# Patient Record
Sex: FEMALE | Race: WHITE | NOT HISPANIC OR LATINO | URBAN - METROPOLITAN AREA
[De-identification: names, ages, dates, MRNs, and addresses within clinical notes are randomized per-mention and may not be internally consistent; named-entity substitution may affect disease eponyms.]

---

## 2020-07-25 ENCOUNTER — TELEPHONE ENCOUNTER (OUTPATIENT)
Dept: URBAN - METROPOLITAN AREA CLINIC 13 | Facility: CLINIC | Age: 65
End: 2020-07-25

## 2020-07-25 RX ORDER — ELUXADOLINE 75 MG/1
TAKE 2 TABLET DAILY TABLET, FILM COATED ORAL
Refills: 0 | OUTPATIENT
End: 2019-01-29

## 2020-07-26 ENCOUNTER — TELEPHONE ENCOUNTER (OUTPATIENT)
Dept: URBAN - METROPOLITAN AREA CLINIC 13 | Facility: CLINIC | Age: 65
End: 2020-07-26

## 2020-07-26 RX ORDER — DICYCLOMINE HYDROCHLORIDE 10 MG/1
TAKE 1 CAPSULE BY MOUTH  EVERY 6 HOURS AS NEEDED FOR ABDOMINAL PAIN CAPSULE ORAL
Qty: 60 | Refills: 1 | Status: ACTIVE | COMMUNITY
Start: 2019-04-18

## 2020-07-26 RX ORDER — RIZATRIPTAN BENZOATE 10 MG/1
TAKE 1 TABLET AT ONSET OF HEADACHE. MAY REPEAT EVERY 2 HOURS AS NEEDED. MAXIMUM 3 TABLETS IN 24 HOURS TABLET ORAL
Refills: 0 | Status: ACTIVE | COMMUNITY
Start: 2018-09-26

## 2020-07-26 RX ORDER — GLUCOSAM/MSM/C/MAN/WILLOW/GING 500-333.3
TAKE AS DIRECTED TABLET ORAL
Refills: 0 | Status: ACTIVE | COMMUNITY

## 2020-07-26 RX ORDER — POTASSIUM CHLORIDE 750 MG/1
TABLET, EXTENDED RELEASE ORAL
Qty: 30 | Refills: 0 | Status: ACTIVE | COMMUNITY
Start: 2019-01-02

## 2020-07-26 RX ORDER — TOPIRAMATE 50 MG/1
TAKE 1 TABLET TWICE DAILY TABLET, FILM COATED ORAL
Refills: 0 | Status: ACTIVE | COMMUNITY
Start: 2018-03-07

## 2020-07-26 RX ORDER — DICLOFENAC SODIUM 75 MG/1
TK 1 T PO  BID TABLET, DELAYED RELEASE ORAL
Qty: 60 | Refills: 0 | Status: ACTIVE | COMMUNITY
Start: 2018-09-27

## 2020-07-26 RX ORDER — FLUOXETINE HYDROCHLORIDE 40 MG/1
TAKE 1 CAPSULE DAILY CAPSULE ORAL
Refills: 0 | Status: ACTIVE | COMMUNITY
Start: 2018-04-18

## 2020-07-26 RX ORDER — AMITRIPTYLINE HYDROCHLORIDE 25 MG/1
TABLET, FILM COATED ORAL
Qty: 30 | Refills: 0 | Status: ACTIVE | COMMUNITY
Start: 2019-05-06

## 2020-07-26 RX ORDER — METRONIDAZOLE 500 MG/1
TABLET ORAL
Qty: 10 | Refills: 0 | Status: ACTIVE | COMMUNITY
Start: 2018-11-16

## 2020-07-26 RX ORDER — PRAVASTATIN SODIUM 40 MG/1
TAKE 1 TABLET DAILY AS DIRECTED TABLET ORAL
Refills: 0 | Status: ACTIVE | COMMUNITY
Start: 2018-03-07

## 2020-07-26 RX ORDER — EYELID CLEANSER COMBINATION 1
CLEAN FROM THE BROW BONE TO BELOW THE EYE SOCKET OF THE OPERATIVE EYE FOAM (ML) TOPICAL
Qty: 48 | Refills: 0 | Status: ACTIVE | COMMUNITY
Start: 2018-03-13

## 2020-07-26 RX ORDER — SENNOSIDES 8.6 MG
TAKE 1 CAPSULE DAILY WITH A MEAL TABLET ORAL
Refills: 0 | Status: ACTIVE | COMMUNITY

## 2020-07-26 RX ORDER — FLUOXETINE 20 MG/1
CAPSULE ORAL
Qty: 57 | Refills: 0 | Status: ACTIVE | COMMUNITY
Start: 2018-03-07

## 2020-07-26 RX ORDER — GABAPENTIN 100 MG/1
TAKE 3 CAPSULE TWICE DAILY CAPSULE ORAL
Refills: 0 | Status: ACTIVE | COMMUNITY
Start: 2018-01-17

## 2020-07-26 RX ORDER — DIPHENOXYLATE HYDROCHLORIDE AND ATROPINE SULFATE 2.5; .025 MG/1; MG/1
TABLET ORAL
Qty: 6 | Refills: 0 | Status: ACTIVE | COMMUNITY
Start: 2018-11-27

## 2020-07-26 RX ORDER — IVERMECTIN 10 MG/G
CREAM TOPICAL
Refills: 0 | Status: ACTIVE | COMMUNITY

## 2020-07-26 RX ORDER — LOSARTAN POTASSIUM 50 MG/1
TABLET, FILM COATED ORAL
Qty: 30 | Refills: 0 | Status: ACTIVE | COMMUNITY
Start: 2018-01-18

## 2020-07-26 RX ORDER — SODIUM CHLORIDE 50 MG/ML
INSTILL 1 DROP INTO RIGHT EYE THREE TIMES A DAY SOLUTION/ DROPS OPHTHALMIC
Qty: 15 | Refills: 0 | Status: ACTIVE | COMMUNITY
Start: 2018-03-27

## 2020-07-26 RX ORDER — PROMETHAZINE HYDROCHLORIDE 25 MG/1
TAKE 1 TABLET EVERY 6 HOURS PRN NAUSEA TABLET ORAL
Refills: 3 | Status: ACTIVE | COMMUNITY

## 2020-07-26 RX ORDER — GABAPENTIN 300 MG/1
CAPSULE ORAL
Qty: 270 | Refills: 0 | Status: ACTIVE | COMMUNITY
Start: 2018-06-22

## 2020-07-26 RX ORDER — CIPROFLOXACIN HYDROCHLORIDE 500 MG/1
TABLET, FILM COATED ORAL
Qty: 10 | Refills: 0 | Status: ACTIVE | COMMUNITY
Start: 2018-11-16

## 2020-07-26 RX ORDER — HYDROCHLOROTHIAZIDE 25 MG/1
TABLET ORAL
Qty: 30 | Refills: 0 | Status: ACTIVE | COMMUNITY
Start: 2018-01-18

## 2020-07-26 RX ORDER — PREDNISONE 10 MG/1
TAKE AS DIRECTED TABLET ORAL
Qty: 21 | Refills: 0 | Status: ACTIVE | COMMUNITY
Start: 2018-09-26

## 2020-07-26 RX ORDER — LOSARTAN POTASSIUM AND HYDROCHLOROTHIAZIDE 100; 25 MG/1; MG/1
TAKE 1 TABLET DAILY TABLET, FILM COATED ORAL
Refills: 0 | Status: ACTIVE | COMMUNITY
Start: 2018-03-07

## 2020-07-26 RX ORDER — PANTOPRAZOLE SODIUM 40 MG/1
TAKE ONE TABLET TWICE A DAY TABLET, DELAYED RELEASE ORAL
Qty: 60 | Refills: 2 | Status: ACTIVE | COMMUNITY
Start: 2018-03-07

## 2020-07-26 RX ORDER — MINOCYCLINE HYDROCHLORIDE 100 MG/1
TAKE 1 TABLET AT BEDTIME TABLET, FILM COATED ORAL
Refills: 0 | Status: ACTIVE | COMMUNITY
Start: 2019-01-02

## 2020-07-26 RX ORDER — ESCITALOPRAM 20 MG/1
TABLET, FILM COATED ORAL
Qty: 30 | Refills: 0 | Status: ACTIVE | COMMUNITY
Start: 2018-01-05

## 2020-07-26 RX ORDER — POTASSIUM CHLORIDE 750 MG/1
TAKE 1 TABLET DAILY TABLET, EXTENDED RELEASE ORAL
Refills: 0 | Status: ACTIVE | COMMUNITY
Start: 2018-08-08

## 2020-07-26 RX ORDER — MINOCYCLINE HYDROCHLORIDE 100 MG/1
CAPSULE ORAL
Qty: 60 | Refills: 0 | Status: ACTIVE | COMMUNITY
Start: 2019-06-06

## 2020-07-26 RX ORDER — OXYCODONE AND ACETAMINOPHEN 10; 325 MG/1; MG/1
TABLET ORAL
Qty: 20 | Refills: 0 | Status: ACTIVE | COMMUNITY
Start: 2018-07-25

## 2020-07-26 RX ORDER — TRAMADOL HYDROCHLORIDE 50 MG/1
TABLET ORAL
Qty: 40 | Refills: 0 | Status: ACTIVE | COMMUNITY
Start: 2018-08-30

## 2020-07-26 RX ORDER — PSYLLIUM HUSK 0.4 G
USE AS DIRECTED CAPSULE ORAL
Refills: 0 | Status: ACTIVE | COMMUNITY

## 2020-07-26 RX ORDER — METHYLPREDNISOLONE 4 MG/1
TABLET ORAL
Qty: 21 | Refills: 0 | Status: ACTIVE | COMMUNITY
Start: 2018-10-31

## 2020-09-08 ENCOUNTER — WEB ENCOUNTER (OUTPATIENT)
Dept: URBAN - METROPOLITAN AREA CLINIC 113 | Facility: CLINIC | Age: 65
End: 2020-09-08

## 2020-09-08 ENCOUNTER — TELEPHONE ENCOUNTER (OUTPATIENT)
Dept: URBAN - METROPOLITAN AREA CLINIC 113 | Facility: CLINIC | Age: 65
End: 2020-09-08

## 2020-09-08 VITALS — WEIGHT: 250 LBS | HEIGHT: 66 IN | BODY MASS INDEX: 40.18 KG/M2

## 2020-09-08 RX ORDER — MINOCYCLINE HYDROCHLORIDE 100 MG/1
TAKE 1 TABLET AT BEDTIME TABLET, FILM COATED ORAL
Refills: 0 | Status: ACTIVE | COMMUNITY
Start: 2019-01-02

## 2020-09-08 RX ORDER — PANTOPRAZOLE SODIUM 40 MG/1
TAKE ONE TABLET TWICE A DAY TABLET, DELAYED RELEASE ORAL
Qty: 60 | Refills: 2 | Status: ACTIVE | COMMUNITY
Start: 2018-03-07

## 2020-09-08 RX ORDER — POTASSIUM CHLORIDE 750 MG/1
TABLET, EXTENDED RELEASE ORAL
Qty: 30 | Refills: 0 | Status: ON HOLD | COMMUNITY
Start: 2019-01-02

## 2020-09-08 RX ORDER — FLUOXETINE HYDROCHLORIDE 40 MG/1
TAKE 1 CAPSULE DAILY CAPSULE ORAL
Refills: 0 | Status: ACTIVE | COMMUNITY
Start: 2018-04-18

## 2020-09-08 RX ORDER — TOPIRAMATE 50 MG/1
TAKE 1 TABLET TWICE DAILY TABLET, FILM COATED ORAL
Refills: 0 | Status: ACTIVE | COMMUNITY
Start: 2018-03-07

## 2020-09-08 RX ORDER — PREGABALIN 100 MG/1
1 CAPSULE CAPSULE ORAL ONCE A DAY
Status: ACTIVE | COMMUNITY

## 2020-09-08 RX ORDER — SENNOSIDES 8.6 MG
TAKE 1 CAPSULE DAILY WITH A MEAL TABLET ORAL
Refills: 0 | Status: ACTIVE | COMMUNITY

## 2020-09-08 RX ORDER — DICYCLOMINE HYDROCHLORIDE 10 MG/1
TAKE 1 CAPSULE BY MOUTH  EVERY 6 HOURS AS NEEDED FOR ABDOMINAL PAIN CAPSULE ORAL
Qty: 60 | Refills: 1 | Status: ACTIVE | COMMUNITY
Start: 2019-04-18

## 2020-09-08 RX ORDER — PRAVASTATIN SODIUM 40 MG/1
TAKE 1 TABLET DAILY AS DIRECTED TABLET ORAL
Refills: 0 | Status: ACTIVE | COMMUNITY
Start: 2018-03-07

## 2020-09-08 RX ORDER — TRAMADOL HYDROCHLORIDE 50 MG/1
TABLET ORAL
Qty: 40 | Refills: 0 | Status: ON HOLD | COMMUNITY
Start: 2018-08-30

## 2020-09-08 RX ORDER — ESCITALOPRAM 20 MG/1
TABLET, FILM COATED ORAL
Qty: 30 | Refills: 0 | Status: ON HOLD | COMMUNITY
Start: 2018-01-05

## 2020-09-08 RX ORDER — HYDROCHLOROTHIAZIDE 25 MG/1
TABLET ORAL
Qty: 30 | Refills: 0 | Status: ON HOLD | COMMUNITY
Start: 2018-01-18

## 2020-09-08 RX ORDER — ZOLPIDEM TARTRATE 10 MG/1
1 TABLET AT BEDTIME AS NEEDED TABLET, FILM COATED ORAL ONCE A DAY
Status: ACTIVE | COMMUNITY

## 2020-09-08 RX ORDER — METRONIDAZOLE 500 MG/1
TABLET ORAL
Qty: 10 | Refills: 0 | Status: ON HOLD | COMMUNITY
Start: 2018-11-16

## 2020-09-08 RX ORDER — FLUOXETINE 20 MG/1
CAPSULE ORAL
Qty: 57 | Refills: 0 | Status: ON HOLD | COMMUNITY
Start: 2018-03-07

## 2020-09-08 RX ORDER — POTASSIUM CHLORIDE 750 MG/1
TAKE 1 TABLET DAILY TABLET, EXTENDED RELEASE ORAL
Refills: 0 | Status: ON HOLD | COMMUNITY
Start: 2018-08-08

## 2020-09-08 RX ORDER — EYELID CLEANSER COMBINATION 1
CLEAN FROM THE BROW BONE TO BELOW THE EYE SOCKET OF THE OPERATIVE EYE FOAM (ML) TOPICAL
Qty: 48 | Refills: 0 | Status: ON HOLD | COMMUNITY
Start: 2018-03-13

## 2020-09-08 RX ORDER — GABAPENTIN 300 MG/1
CAPSULE ORAL
Qty: 270 | Refills: 0 | Status: ON HOLD | COMMUNITY
Start: 2018-06-22

## 2020-09-08 RX ORDER — GABAPENTIN 100 MG/1
TAKE 3 CAPSULE TWICE DAILY CAPSULE ORAL
Refills: 0 | Status: ON HOLD | COMMUNITY
Start: 2018-01-17

## 2020-09-08 RX ORDER — SODIUM CHLORIDE 50 MG/ML
INSTILL 1 DROP INTO RIGHT EYE THREE TIMES A DAY SOLUTION/ DROPS OPHTHALMIC
Qty: 15 | Refills: 0 | Status: ON HOLD | COMMUNITY
Start: 2018-03-27

## 2020-09-08 RX ORDER — RIZATRIPTAN BENZOATE 10 MG/1
TAKE 1 TABLET AT ONSET OF HEADACHE. MAY REPEAT EVERY 2 HOURS AS NEEDED. MAXIMUM 3 TABLETS IN 24 HOURS TABLET ORAL
Refills: 0 | Status: ACTIVE | COMMUNITY
Start: 2018-09-26

## 2020-09-08 RX ORDER — LEVOCETIRIZINE DIHYDROCHLORIDE 5 MG/1
1 TABLET IN THE EVENING TABLET, FILM COATED ORAL ONCE A DAY
Qty: 30 TABLET | Status: ACTIVE | COMMUNITY

## 2020-09-08 RX ORDER — CIPROFLOXACIN HYDROCHLORIDE 500 MG/1
TABLET, FILM COATED ORAL
Qty: 10 | Refills: 0 | Status: ON HOLD | COMMUNITY
Start: 2018-11-16

## 2020-09-08 RX ORDER — PREDNISONE 10 MG/1
TAKE AS DIRECTED TABLET ORAL
Qty: 21 | Refills: 0 | Status: ON HOLD | COMMUNITY
Start: 2018-09-26

## 2020-09-08 RX ORDER — AMITRIPTYLINE HYDROCHLORIDE 25 MG/1
TABLET, FILM COATED ORAL
Qty: 30 | Refills: 0 | Status: ACTIVE | COMMUNITY
Start: 2019-05-06

## 2020-09-08 RX ORDER — PROMETHAZINE HYDROCHLORIDE 25 MG/1
TAKE 1 TABLET EVERY 6 HOURS PRN NAUSEA TABLET ORAL
Refills: 3 | Status: ACTIVE | COMMUNITY

## 2020-09-08 RX ORDER — OXYCODONE AND ACETAMINOPHEN 10; 325 MG/1; MG/1
TABLET ORAL
Qty: 20 | Refills: 0 | Status: ON HOLD | COMMUNITY
Start: 2018-07-25

## 2020-09-08 RX ORDER — MINOCYCLINE HYDROCHLORIDE 100 MG/1
CAPSULE ORAL
Qty: 60 | Refills: 0 | Status: ON HOLD | COMMUNITY
Start: 2019-06-06

## 2020-09-08 RX ORDER — DICLOFENAC SODIUM 75 MG/1
TK 1 T PO  BID TABLET, DELAYED RELEASE ORAL
Qty: 60 | Refills: 0 | Status: ON HOLD | COMMUNITY
Start: 2018-09-27

## 2020-09-08 RX ORDER — SODIUM, POTASSIUM,MAG SULFATES 17.5-3.13G
354ML SOLUTION, RECONSTITUTED, ORAL ORAL
Qty: 354 ML | Refills: 0 | OUTPATIENT

## 2020-09-08 RX ORDER — LOSARTAN POTASSIUM 50 MG/1
TABLET, FILM COATED ORAL
Qty: 30 | Refills: 0 | Status: ON HOLD | COMMUNITY
Start: 2018-01-18

## 2020-09-08 RX ORDER — DIPHENOXYLATE HYDROCHLORIDE AND ATROPINE SULFATE 2.5; .025 MG/1; MG/1
TABLET ORAL
Qty: 6 | Refills: 0 | Status: ON HOLD | COMMUNITY
Start: 2018-11-27

## 2020-09-08 RX ORDER — LOSARTAN POTASSIUM AND HYDROCHLOROTHIAZIDE 100; 25 MG/1; MG/1
TAKE 1 TABLET DAILY TABLET, FILM COATED ORAL
Refills: 0 | Status: ACTIVE | COMMUNITY
Start: 2018-03-07

## 2020-09-08 RX ORDER — METHYLPREDNISOLONE 4 MG/1
TABLET ORAL
Qty: 21 | Refills: 0 | Status: ON HOLD | COMMUNITY
Start: 2018-10-31

## 2020-09-08 RX ORDER — IVERMECTIN 10 MG/G
CREAM TOPICAL
Refills: 0 | Status: ON HOLD | COMMUNITY

## 2020-09-08 RX ORDER — GLUCOSAM/MSM/C/MAN/WILLOW/GING 500-333.3
TAKE AS DIRECTED TABLET ORAL
Refills: 0 | Status: ACTIVE | COMMUNITY

## 2020-09-08 RX ORDER — PSYLLIUM HUSK 0.4 G
USE AS DIRECTED CAPSULE ORAL
Refills: 0 | Status: ACTIVE | COMMUNITY

## 2020-11-13 ENCOUNTER — OFFICE VISIT (OUTPATIENT)
Dept: URBAN - METROPOLITAN AREA CLINIC 113 | Facility: CLINIC | Age: 65
End: 2020-11-13
Payer: COMMERCIAL

## 2020-11-13 ENCOUNTER — WEB ENCOUNTER (OUTPATIENT)
Dept: URBAN - METROPOLITAN AREA CLINIC 113 | Facility: CLINIC | Age: 65
End: 2020-11-13

## 2020-11-13 VITALS
BODY MASS INDEX: 40.98 KG/M2 | HEIGHT: 66 IN | RESPIRATION RATE: 18 BRPM | SYSTOLIC BLOOD PRESSURE: 128 MMHG | TEMPERATURE: 98.3 F | HEART RATE: 75 BPM | DIASTOLIC BLOOD PRESSURE: 72 MMHG | WEIGHT: 255 LBS

## 2020-11-13 DIAGNOSIS — K58.0 IRRITABLE BOWEL SYNDROME WITH DIARRHEA: ICD-10-CM

## 2020-11-13 DIAGNOSIS — Z91.89 COLON CANCER HIGH RISK: ICD-10-CM

## 2020-11-13 DIAGNOSIS — R10.10 PAIN OF UPPER ABDOMEN: ICD-10-CM

## 2020-11-13 PROCEDURE — 99214 OFFICE O/P EST MOD 30 MIN: CPT | Performed by: NURSE PRACTITIONER

## 2020-11-13 PROCEDURE — G8482 FLU IMMUNIZE ORDER/ADMIN: HCPCS | Performed by: NURSE PRACTITIONER

## 2020-11-13 PROCEDURE — G8427 DOCREV CUR MEDS BY ELIG CLIN: HCPCS | Performed by: NURSE PRACTITIONER

## 2020-11-13 RX ORDER — PRAVASTATIN SODIUM 40 MG/1
TAKE 1 TABLET DAILY AS DIRECTED TABLET ORAL
Refills: 0 | Status: ACTIVE | COMMUNITY
Start: 2018-03-07

## 2020-11-13 RX ORDER — ZOLPIDEM TARTRATE 10 MG/1
1 TABLET AT BEDTIME AS NEEDED TABLET, FILM COATED ORAL ONCE A DAY
Status: ACTIVE | COMMUNITY

## 2020-11-13 RX ORDER — DICYCLOMINE HYDROCHLORIDE 10 MG/1
TAKE 1 CAPSULE BY MOUTH  EVERY 6 HOURS AS NEEDED FOR ABDOMINAL PAIN CAPSULE ORAL
Qty: 60 | Refills: 3
Start: 2019-04-18 | End: 2019-08-16

## 2020-11-13 RX ORDER — RIZATRIPTAN BENZOATE 10 MG/1
TAKE 1 TABLET AT ONSET OF HEADACHE. MAY REPEAT EVERY 2 HOURS AS NEEDED. MAXIMUM 3 TABLETS IN 24 HOURS TABLET ORAL
Refills: 0 | Status: ACTIVE | COMMUNITY
Start: 2018-09-26

## 2020-11-13 RX ORDER — PREGABALIN 100 MG/1
1 CAPSULE CAPSULE ORAL TWICE DAILY
Status: ACTIVE | COMMUNITY

## 2020-11-13 RX ORDER — TOPIRAMATE 50 MG/1
TAKE 1 TABLET TWICE DAILY TABLET, FILM COATED ORAL
Refills: 0 | Status: ACTIVE | COMMUNITY
Start: 2018-03-07

## 2020-11-13 RX ORDER — SENNOSIDES 8.6 MG
TAKE 1 CAPSULE DAILY WITH A MEAL TABLET ORAL
Refills: 0 | Status: ACTIVE | COMMUNITY

## 2020-11-13 RX ORDER — PSYLLIUM HUSK 0.4 G
USE AS DIRECTED CAPSULE ORAL
Refills: 0 | Status: ACTIVE | COMMUNITY

## 2020-11-13 RX ORDER — SODIUM, POTASSIUM,MAG SULFATES 17.5-3.13G
354 ML SOLUTION, RECONSTITUTED, ORAL ORAL
Qty: 354 ML | Refills: 0 | OUTPATIENT

## 2020-11-13 RX ORDER — PANTOPRAZOLE SODIUM 40 MG/1
TAKE ONE TABLET TWICE A DAY TABLET, DELAYED RELEASE ORAL
Qty: 60 | Refills: 2 | Status: ACTIVE | COMMUNITY
Start: 2018-03-07

## 2020-11-13 RX ORDER — MINOCYCLINE HYDROCHLORIDE 100 MG/1
TAKE 1 TABLET AT BEDTIME TABLET, FILM COATED ORAL
Refills: 0 | Status: ACTIVE | COMMUNITY
Start: 2019-01-02

## 2020-11-13 RX ORDER — LOSARTAN POTASSIUM AND HYDROCHLOROTHIAZIDE 100; 25 MG/1; MG/1
TAKE 1 TABLET DAILY TABLET, FILM COATED ORAL
Refills: 0 | Status: ACTIVE | COMMUNITY
Start: 2018-03-07

## 2020-11-13 RX ORDER — GLUCOSAM/MSM/C/MAN/WILLOW/GING 500-333.3
TAKE AS DIRECTED TABLET ORAL
Refills: 0 | Status: ACTIVE | COMMUNITY

## 2020-11-13 RX ORDER — VITAMIN E 200 UNIT
AS DIRECTED CAPSULE ORAL
Status: ACTIVE | COMMUNITY

## 2020-11-13 RX ORDER — FLUOXETINE HYDROCHLORIDE 40 MG/1
TAKE 1 CAPSULE DAILY CAPSULE ORAL
Refills: 0 | Status: ACTIVE | COMMUNITY
Start: 2018-04-18

## 2020-11-13 RX ORDER — DICYCLOMINE HYDROCHLORIDE 10 MG/1
TAKE 1 CAPSULE BY MOUTH  EVERY 6 HOURS AS NEEDED FOR ABDOMINAL PAIN CAPSULE ORAL
Qty: 60 | Refills: 1 | Status: ACTIVE | COMMUNITY
Start: 2019-04-18

## 2020-11-13 NOTE — HPI-TODAY'S VISIT:
This is a 64-year-old female with a history of hypertension, hyperlipidemia, asthma, obesity, sleep apnea, upper GI bleed secondary to peptic ulcer disease (2010 and 2014), and migraines presenting for evaluation of stomach issues and to schedule a colonoscopy.  She was last seen 2/26/2019.  She reported a 1 week history of epigastric pain with nausea and GERD.  This had resolved with high-dose acid suppression.  Her hemoglobin was stable.  She was instructed to continue pantoprazole 40 mg twice a day and to continue dicyclomine as needed for abdominal pain.  EGD was a future consideration. She reports a change in the probiotic she was taking (less concentration of bacteria) 2 months ago.  She began to have an increase in symptoms reporting "gurgling," and frequent diarrhea.  She has resumed her prior probiotic and symptoms have not resolved.  She is having a bowel movement 2-3 times per day.  Stool consistency is variable ranging from formed to liquid.  She has occasional urgency to defecate and occasional nocturnal stools.  She denies bloating but reports excessive gas and excessive abdominal noise.  She had pain across her upper abdomen for 2 days last week.  This occurred at random and was unassociated with meals or defecation.  This has resolved.  She has occasional nausea.  She was experiencing a raspy voice.  This resolved.  She is  taking a Metamucil pill daily.  She denies antecedent antibiotics. Her last colonoscopy was 5 years ago.  She has a family history of colon cancer in 2 second-degree relatives (maternal grandmother and paternal grandmother).

## 2020-11-15 ENCOUNTER — LAB OUTSIDE AN ENCOUNTER (OUTPATIENT)
Dept: URBAN - METROPOLITAN AREA CLINIC 113 | Facility: CLINIC | Age: 65
End: 2020-11-15

## 2020-11-15 PROBLEM — 83132003: Status: ACTIVE | Noted: 2020-11-13

## 2020-12-03 ENCOUNTER — OFFICE VISIT (OUTPATIENT)
Dept: URBAN - METROPOLITAN AREA SURGERY CENTER 25 | Facility: SURGERY CENTER | Age: 65
End: 2020-12-03
Payer: COMMERCIAL

## 2020-12-03 DIAGNOSIS — Z80.0 FAMILY HISTORY MALIGNANT NEOPLASM OF BILIARY TRACT: ICD-10-CM

## 2020-12-03 PROCEDURE — G9935 CANC NOT DETECTD DURING SRCN: HCPCS | Performed by: INTERNAL MEDICINE

## 2020-12-03 PROCEDURE — G8907 PT DOC NO EVENTS ON DISCHARG: HCPCS | Performed by: INTERNAL MEDICINE

## 2020-12-03 PROCEDURE — G0105 COLORECTAL SCRN; HI RISK IND: HCPCS | Performed by: INTERNAL MEDICINE

## 2020-12-03 RX ORDER — GLUCOSAM/MSM/C/MAN/WILLOW/GING 500-333.3
TAKE AS DIRECTED TABLET ORAL
Refills: 0 | Status: ACTIVE | COMMUNITY

## 2020-12-03 RX ORDER — RIZATRIPTAN BENZOATE 10 MG/1
TAKE 1 TABLET AT ONSET OF HEADACHE. MAY REPEAT EVERY 2 HOURS AS NEEDED. MAXIMUM 3 TABLETS IN 24 HOURS TABLET ORAL
Refills: 0 | Status: ACTIVE | COMMUNITY
Start: 2018-09-26

## 2020-12-03 RX ORDER — TOPIRAMATE 50 MG/1
TAKE 1 TABLET TWICE DAILY TABLET, FILM COATED ORAL
Refills: 0 | Status: ACTIVE | COMMUNITY
Start: 2018-03-07

## 2020-12-03 RX ORDER — PREGABALIN 100 MG/1
1 CAPSULE CAPSULE ORAL TWICE DAILY
Status: ACTIVE | COMMUNITY

## 2020-12-03 RX ORDER — FLUOXETINE HYDROCHLORIDE 40 MG/1
TAKE 1 CAPSULE DAILY CAPSULE ORAL
Refills: 0 | Status: ACTIVE | COMMUNITY
Start: 2018-04-18

## 2020-12-03 RX ORDER — PRAVASTATIN SODIUM 40 MG/1
TAKE 1 TABLET DAILY AS DIRECTED TABLET ORAL
Refills: 0 | Status: ACTIVE | COMMUNITY
Start: 2018-03-07

## 2020-12-03 RX ORDER — MINOCYCLINE HYDROCHLORIDE 100 MG/1
TAKE 1 TABLET AT BEDTIME TABLET, FILM COATED ORAL
Refills: 0 | Status: ACTIVE | COMMUNITY
Start: 2019-01-02

## 2020-12-03 RX ORDER — SENNOSIDES 8.6 MG
TAKE 1 CAPSULE DAILY WITH A MEAL TABLET ORAL
Refills: 0 | Status: ACTIVE | COMMUNITY

## 2020-12-03 RX ORDER — LOSARTAN POTASSIUM AND HYDROCHLOROTHIAZIDE 100; 25 MG/1; MG/1
TAKE 1 TABLET DAILY TABLET, FILM COATED ORAL
Refills: 0 | Status: ACTIVE | COMMUNITY
Start: 2018-03-07

## 2020-12-03 RX ORDER — ZOLPIDEM TARTRATE 10 MG/1
1 TABLET AT BEDTIME AS NEEDED TABLET, FILM COATED ORAL ONCE A DAY
Status: ACTIVE | COMMUNITY

## 2020-12-03 RX ORDER — PSYLLIUM HUSK 0.4 G
USE AS DIRECTED CAPSULE ORAL
Refills: 0 | Status: ACTIVE | COMMUNITY

## 2020-12-03 RX ORDER — PANTOPRAZOLE SODIUM 40 MG/1
TAKE ONE TABLET TWICE A DAY TABLET, DELAYED RELEASE ORAL
Qty: 60 | Refills: 2 | Status: ACTIVE | COMMUNITY
Start: 2018-03-07

## 2020-12-03 RX ORDER — VITAMIN E 200 UNIT
AS DIRECTED CAPSULE ORAL
Status: ACTIVE | COMMUNITY

## 2020-12-03 RX ORDER — SODIUM, POTASSIUM,MAG SULFATES 17.5-3.13G
354 ML SOLUTION, RECONSTITUTED, ORAL ORAL
Qty: 354 ML | Refills: 0 | Status: ACTIVE | COMMUNITY

## 2020-12-17 ENCOUNTER — OFFICE VISIT (OUTPATIENT)
Dept: URBAN - METROPOLITAN AREA CLINIC 113 | Facility: CLINIC | Age: 65
End: 2020-12-17

## 2021-01-05 ENCOUNTER — OFFICE VISIT (OUTPATIENT)
Dept: URBAN - METROPOLITAN AREA CLINIC 113 | Facility: CLINIC | Age: 66
End: 2021-01-05
Payer: COMMERCIAL

## 2021-01-05 VITALS
HEIGHT: 66 IN | SYSTOLIC BLOOD PRESSURE: 123 MMHG | RESPIRATION RATE: 18 BRPM | WEIGHT: 255 LBS | BODY MASS INDEX: 40.98 KG/M2 | TEMPERATURE: 96.7 F | HEART RATE: 66 BPM | DIASTOLIC BLOOD PRESSURE: 69 MMHG

## 2021-01-05 DIAGNOSIS — K21.9 GASTROESOPHAGEAL REFLUX DISEASE, UNSPECIFIED WHETHER ESOPHAGITIS PRESENT: ICD-10-CM

## 2021-01-05 DIAGNOSIS — K58.0 IRRITABLE BOWEL SYNDROME WITH DIARRHEA: ICD-10-CM

## 2021-01-05 DIAGNOSIS — K57.30 COLON, DIVERTICULOSIS: ICD-10-CM

## 2021-01-05 DIAGNOSIS — Z80.0 FAMILY HISTORY OF COLON CANCER: ICD-10-CM

## 2021-01-05 DIAGNOSIS — Z91.89 COLON CANCER HIGH RISK: ICD-10-CM

## 2021-01-05 PROBLEM — 313424005: Status: ACTIVE | Noted: 2020-09-09

## 2021-01-05 PROCEDURE — G8427 DOCREV CUR MEDS BY ELIG CLIN: HCPCS | Performed by: NURSE PRACTITIONER

## 2021-01-05 PROCEDURE — 99213 OFFICE O/P EST LOW 20 MIN: CPT | Performed by: NURSE PRACTITIONER

## 2021-01-05 PROCEDURE — G9903 PT SCRN TBCO ID AS NON USER: HCPCS | Performed by: NURSE PRACTITIONER

## 2021-01-05 PROCEDURE — 1036F TOBACCO NON-USER: CPT | Performed by: NURSE PRACTITIONER

## 2021-01-05 RX ORDER — SENNOSIDES 8.6 MG
TAKE 1 CAPSULE DAILY WITH A MEAL TABLET ORAL
Refills: 0 | Status: ACTIVE | COMMUNITY

## 2021-01-05 RX ORDER — FLUOXETINE HYDROCHLORIDE 40 MG/1
TAKE 1 CAPSULE DAILY CAPSULE ORAL
Refills: 0 | Status: ACTIVE | COMMUNITY
Start: 2018-04-18

## 2021-01-05 RX ORDER — PREGABALIN 100 MG/1
1 CAPSULE CAPSULE ORAL TWICE DAILY
Status: ACTIVE | COMMUNITY

## 2021-01-05 RX ORDER — GLUCOSAM/MSM/C/MAN/WILLOW/GING 500-333.3
TAKE AS DIRECTED TABLET ORAL
Refills: 0 | Status: ACTIVE | COMMUNITY

## 2021-01-05 RX ORDER — PANTOPRAZOLE SODIUM 40 MG/1
TAKE ONE TABLET TWICE A DAY TABLET, DELAYED RELEASE ORAL
Qty: 60 | Refills: 2 | Status: ACTIVE | COMMUNITY
Start: 2018-03-07

## 2021-01-05 RX ORDER — RIZATRIPTAN BENZOATE 10 MG/1
TAKE 1 TABLET AT ONSET OF HEADACHE. MAY REPEAT EVERY 2 HOURS AS NEEDED. MAXIMUM 3 TABLETS IN 24 HOURS TABLET ORAL
Refills: 0 | Status: ACTIVE | COMMUNITY
Start: 2018-09-26

## 2021-01-05 RX ORDER — PSYLLIUM HUSK 0.4 G
USE AS DIRECTED CAPSULE ORAL
Refills: 0 | Status: ACTIVE | COMMUNITY

## 2021-01-05 RX ORDER — PRAVASTATIN SODIUM 40 MG/1
TAKE 1 TABLET DAILY AS DIRECTED TABLET ORAL
Refills: 0 | Status: ACTIVE | COMMUNITY
Start: 2018-03-07

## 2021-01-05 RX ORDER — VITAMIN E 200 UNIT
AS DIRECTED CAPSULE ORAL
Status: ACTIVE | COMMUNITY

## 2021-01-05 RX ORDER — TOPIRAMATE 50 MG/1
TAKE 1 TABLET TWICE DAILY TABLET, FILM COATED ORAL
Refills: 0 | Status: ACTIVE | COMMUNITY
Start: 2018-03-07

## 2021-01-05 RX ORDER — MINOCYCLINE HYDROCHLORIDE 100 MG/1
TAKE 1 TABLET AT BEDTIME TABLET, FILM COATED ORAL
Refills: 0 | Status: ACTIVE | COMMUNITY
Start: 2019-01-02

## 2021-01-05 RX ORDER — LOSARTAN POTASSIUM AND HYDROCHLOROTHIAZIDE 100; 25 MG/1; MG/1
TAKE 1 TABLET DAILY TABLET, FILM COATED ORAL
Refills: 0 | Status: ACTIVE | COMMUNITY
Start: 2018-03-07

## 2021-01-05 RX ORDER — ZOLPIDEM TARTRATE 10 MG/1
1 TABLET AT BEDTIME AS NEEDED TABLET, FILM COATED ORAL ONCE A DAY
Status: ACTIVE | COMMUNITY

## 2021-01-05 NOTE — HPI-TODAY'S VISIT:
This is a 65-year-old female with a history of hypertension, hyperlipidemia, asthma, obesity, sleep apnea, migraines, upper GI bleed secondary to peptic ulcer disease (2010 and 2014), presenting for follow-up after a colonoscopy.  She was last seen in the office 4/26/2019 for evaluation of epigastric pain and nausea for 1 week.  Symptoms had resolved with high-dose acid suppression.  Her hemoglobin was stable.  GERD was controlled.  EGD was a consideration if symptoms worsened or recurred.  She was instructed to continue pantoprazole 40 mg twice a day and dicyclomine as needed for abdominal pain.  Colonoscopy report below.  She is taking pantoprazole 40 mg twice a day.  Acid reflux is well controlled.  She denies difficulty swallowing food but reports an occasional sensation of pills lodging her esophagus relieved with drinking copious amounts of water or eating food.  She has a history of abdominal pain that resolved when she began taking a probiotic and daily fiber pills18 months ago.  She has a loose or soft bowel movement 2-3 times a day. She denies red blood per rectum or melena.  She denies any other abdominal symptoms.  She has a history of a significant upper GI bleed in 2014.  She was hospitalized and treated in the intensive care unit.  She required 10 units of blood during the hospitalization.  She reports that an EGD was attempted during this hospitalization and, due to a "problem with my epiglottis," the procedure was aborted because they "were losing me."  She states this represents a second time that she had an upper GI bleed associated with an ulcer.  She does not recall that she has ever completed an EGD.

## 2021-01-05 NOTE — HPI-OTHER HISTORIES
Colonoscopy 12/3/2020: BBPS 9, sigmoid diverticulosis, otherwise normal.  Repeat colonoscopy recommended in 5 years.

## 2021-12-08 ENCOUNTER — OFFICE VISIT (OUTPATIENT)
Dept: URBAN - METROPOLITAN AREA CLINIC 113 | Facility: CLINIC | Age: 66
End: 2021-12-08

## 2021-12-08 RX ORDER — LOSARTAN POTASSIUM AND HYDROCHLOROTHIAZIDE 100; 25 MG/1; MG/1
TAKE 1 TABLET DAILY TABLET, FILM COATED ORAL
Refills: 0 | Status: ACTIVE | COMMUNITY
Start: 2018-03-07

## 2021-12-08 RX ORDER — ZOLPIDEM TARTRATE 10 MG/1
1 TABLET AT BEDTIME AS NEEDED TABLET, FILM COATED ORAL ONCE A DAY
Status: ACTIVE | COMMUNITY

## 2021-12-08 RX ORDER — FLUOXETINE HYDROCHLORIDE 40 MG/1
TAKE 1 CAPSULE DAILY CAPSULE ORAL
Refills: 0 | Status: ACTIVE | COMMUNITY
Start: 2018-04-18

## 2021-12-08 RX ORDER — GLUCOSAM/MSM/C/MAN/WILLOW/GING 500-333.3
TAKE AS DIRECTED TABLET ORAL
Refills: 0 | Status: ACTIVE | COMMUNITY

## 2021-12-08 RX ORDER — PANTOPRAZOLE SODIUM 40 MG/1
TAKE ONE TABLET TWICE A DAY TABLET, DELAYED RELEASE ORAL
Qty: 60 | Refills: 2 | Status: ACTIVE | COMMUNITY
Start: 2018-03-07

## 2021-12-08 RX ORDER — PREGABALIN 100 MG/1
1 CAPSULE CAPSULE ORAL TWICE DAILY
Status: ACTIVE | COMMUNITY

## 2021-12-08 RX ORDER — MINOCYCLINE HYDROCHLORIDE 100 MG/1
TAKE 1 TABLET AT BEDTIME TABLET, FILM COATED ORAL
Refills: 0 | Status: ACTIVE | COMMUNITY
Start: 2019-01-02

## 2021-12-08 RX ORDER — SENNOSIDES 8.6 MG
TAKE 1 CAPSULE DAILY WITH A MEAL TABLET ORAL
Refills: 0 | Status: ACTIVE | COMMUNITY

## 2021-12-08 RX ORDER — VITAMIN E 200 UNIT
AS DIRECTED CAPSULE ORAL
Status: ACTIVE | COMMUNITY

## 2021-12-08 RX ORDER — PRAVASTATIN SODIUM 40 MG/1
TAKE 1 TABLET DAILY AS DIRECTED TABLET ORAL
Refills: 0 | Status: ACTIVE | COMMUNITY
Start: 2018-03-07

## 2021-12-08 RX ORDER — RIZATRIPTAN BENZOATE 10 MG/1
TAKE 1 TABLET AT ONSET OF HEADACHE. MAY REPEAT EVERY 2 HOURS AS NEEDED. MAXIMUM 3 TABLETS IN 24 HOURS TABLET ORAL
Refills: 0 | Status: ACTIVE | COMMUNITY
Start: 2018-09-26

## 2021-12-08 RX ORDER — PSYLLIUM HUSK 0.4 G
USE AS DIRECTED CAPSULE ORAL
Refills: 0 | Status: ACTIVE | COMMUNITY

## 2021-12-08 RX ORDER — TOPIRAMATE 50 MG/1
TAKE 1 TABLET TWICE DAILY TABLET, FILM COATED ORAL
Refills: 0 | Status: ACTIVE | COMMUNITY
Start: 2018-03-07

## 2021-12-21 ENCOUNTER — WEB ENCOUNTER (OUTPATIENT)
Dept: URBAN - METROPOLITAN AREA CLINIC 113 | Facility: CLINIC | Age: 66
End: 2021-12-21

## 2021-12-21 ENCOUNTER — OFFICE VISIT (OUTPATIENT)
Dept: URBAN - METROPOLITAN AREA CLINIC 113 | Facility: CLINIC | Age: 66
End: 2021-12-21
Payer: COMMERCIAL

## 2021-12-21 VITALS
HEART RATE: 82 BPM | TEMPERATURE: 96.6 F | BODY MASS INDEX: 42.62 KG/M2 | HEIGHT: 66 IN | DIASTOLIC BLOOD PRESSURE: 82 MMHG | WEIGHT: 265.2 LBS | SYSTOLIC BLOOD PRESSURE: 153 MMHG | RESPIRATION RATE: 22 BRPM

## 2021-12-21 DIAGNOSIS — K58.0 IRRITABLE BOWEL SYNDROME WITH DIARRHEA: ICD-10-CM

## 2021-12-21 DIAGNOSIS — K21.9 GASTROESOPHAGEAL REFLUX DISEASE, UNSPECIFIED WHETHER ESOPHAGITIS PRESENT: ICD-10-CM

## 2021-12-21 DIAGNOSIS — R74.8 ELEVATED ALKALINE PHOSPHATASE LEVEL: ICD-10-CM

## 2021-12-21 DIAGNOSIS — Z80.0 FAMILY HISTORY OF COLON CANCER: ICD-10-CM

## 2021-12-21 DIAGNOSIS — K57.30 COLON, DIVERTICULOSIS: ICD-10-CM

## 2021-12-21 DIAGNOSIS — R13.19 ESOPHAGEAL DYSPHAGIA: ICD-10-CM

## 2021-12-21 PROCEDURE — 99213 OFFICE O/P EST LOW 20 MIN: CPT

## 2021-12-21 RX ORDER — GLUCOSAM/MSM/C/MAN/WILLOW/GING 500-333.3
TAKE AS DIRECTED TABLET ORAL
Refills: 0 | Status: ACTIVE | COMMUNITY

## 2021-12-21 RX ORDER — PANTOPRAZOLE SODIUM 40 MG/1
TAKE ONE TABLET TWICE A DAY TABLET, DELAYED RELEASE ORAL TWICE DAILY
Qty: 180 | Refills: 3 | OUTPATIENT
Start: 2018-03-07

## 2021-12-21 RX ORDER — ZOLPIDEM TARTRATE 10 MG/1
1 TABLET AT BEDTIME AS NEEDED TABLET, FILM COATED ORAL ONCE A DAY
Status: ON HOLD | COMMUNITY

## 2021-12-21 RX ORDER — DICYCLOMINE HYDROCHLORIDE 10 MG/1
2 CAPSULES CAPSULE ORAL THREE TIMES A DAY
Status: ACTIVE | COMMUNITY

## 2021-12-21 RX ORDER — FLUOXETINE HYDROCHLORIDE 40 MG/1
TAKE 1 CAPSULE DAILY CAPSULE ORAL
Refills: 0 | Status: ACTIVE | COMMUNITY
Start: 2018-04-18

## 2021-12-21 RX ORDER — PANTOPRAZOLE SODIUM 40 MG/1
TAKE ONE TABLET TWICE A DAY TABLET, DELAYED RELEASE ORAL
Qty: 60 | Refills: 2 | Status: ACTIVE | COMMUNITY
Start: 2018-03-07

## 2021-12-21 RX ORDER — MINOCYCLINE HYDROCHLORIDE 100 MG/1
1 TABLET TABLET, FILM COATED ORAL
Refills: 0 | Status: ACTIVE | COMMUNITY
Start: 2019-01-02

## 2021-12-21 RX ORDER — PRAVASTATIN SODIUM 40 MG/1
TAKE 1 TABLET DAILY AS DIRECTED TABLET ORAL
Refills: 0 | Status: ACTIVE | COMMUNITY
Start: 2018-03-07

## 2021-12-21 RX ORDER — TOPIRAMATE 50 MG/1
TAKE 1 TABLET TWICE DAILY TABLET, FILM COATED ORAL
Refills: 0 | Status: ACTIVE | COMMUNITY
Start: 2018-03-07

## 2021-12-21 RX ORDER — PREGABALIN 100 MG/1
1 CAPSULE CAPSULE ORAL TWICE DAILY
Status: ACTIVE | COMMUNITY

## 2021-12-21 RX ORDER — VITAMIN E 200 UNIT
AS DIRECTED CAPSULE ORAL
Status: ACTIVE | COMMUNITY

## 2021-12-21 RX ORDER — PSYLLIUM HUSK 0.4 G
USE AS DIRECTED CAPSULE ORAL
Refills: 0 | Status: ACTIVE | COMMUNITY

## 2021-12-21 RX ORDER — DICYCLOMINE HYDROCHLORIDE 10 MG/1
2 CAPSULES CAPSULE ORAL THREE TIMES A DAY
Qty: 180 CAPSULE | Refills: 3

## 2021-12-21 RX ORDER — SENNOSIDES 8.6 MG
TAKE 1 CAPSULE DAILY WITH A MEAL TABLET ORAL
Refills: 0 | Status: ACTIVE | COMMUNITY

## 2021-12-21 RX ORDER — RIZATRIPTAN BENZOATE 10 MG/1
TAKE 1 TABLET AT ONSET OF HEADACHE. MAY REPEAT EVERY 2 HOURS AS NEEDED. MAXIMUM 3 TABLETS IN 24 HOURS TABLET ORAL
Refills: 0 | Status: ACTIVE | COMMUNITY
Start: 2018-09-26

## 2021-12-21 RX ORDER — LOSARTAN POTASSIUM AND HYDROCHLOROTHIAZIDE 100; 25 MG/1; MG/1
TAKE 1 TABLET DAILY TABLET, FILM COATED ORAL
Refills: 0 | Status: ACTIVE | COMMUNITY
Start: 2018-03-07

## 2021-12-21 NOTE — PHYSICAL EXAM EYES:
Conjuntivae and eyelids appear normal , Sclerae : White without injection, no icterus. OPERATIVE REPORT  PATIENT NAME: Carroll Wisdom    :  1962  MRN: [id number]  Pt Location: AN OR ROOM 03    SURGERY DATE: 2019    Surgeon(s) and Role:     * Popeye Somers MD - Primary     * Carmen Campbell PA-C - Assisting     * Sun Edouard MD - Assisting    Preop Diagnosis:  Multiple wounds of skin [R23 8]  Cellulitis [L03 90]    Post-Op Diagnosis Codes:     * Multiple wounds of skin [R23 8]     * Cellulitis [L03 90]    Procedure(s) (LRB):  CHANGE DRESSING (Bilateral)    Specimen(s):  * No specimens in log *    Estimated Blood Loss:   Minimal    Drains:  * No LDAs found *    Anesthesia Type:   General    Operative Indications:  Multiple wounds of skin [R23 8]  Cellulitis [L03 90]      Operative Findings:  Satisfactory take of skin graft   90% right lower extremity   90 % left lower extremity     Complications:   None    Procedure and Technique:  Patient was met in the preoperative holding area and all the last minute questions were properly answered  He was then taken to the operative theater and placed in supine position  Smooth anesthesia was then induced surgical time-out was performed and all the dressings were removed carefully saline irrigation to damp dressings and a portion of the graft  The there was a septal taken bilateral lower extremity graft with 90% take  All the areas were then retraced with bacitracin, Adaptic, ABDs, Kerlix and Ace wraps  Patient tolerated well the procedure  I was present for the entire procedure and A physician assistant was required during the procedure for retraction tissue handling,dissection and suturing    Patient Disposition:  PACU  and hemodynamically stable     Plan: This point patient will continue to do dressing changes every other day at the bedside      SIGNATURE: John Silverio MD  DATE: 2019  TIME: 9:51 AM

## 2021-12-21 NOTE — HPI-OTHER HISTORIES
Hospital mission at [facility] on 2/14/2014:Patient presented with GI bleed.  EGD performed at that time found a gastric ulcer.  Patient was transfused with 7 units of blood total.  She underwent rescoping and liver blood requirements and a scab was present.  It was stable.  Hemoglobin remained stable after second EGD biopsy from EGD revealed unremarkable gastric mucosa with slight stromal fibrosis.

## 2021-12-21 NOTE — PHYSICAL EXAM LUNGS:
no increased work of breathing or signs of respiratory distress, clear to auscultation bilaterally  , no wheezes, rales, rhonchi

## 2021-12-21 NOTE — PHYSICAL EXAM HENT:
atraumatic , normocephalic
Keep steri-strips clean, dry and intact x 24 hrs. Apply water proof ice pack 20 mins on, 20 mins off to help decrease pain and swelling. After 24 hrs, you may begin showering as usual but Do NOT remove steri-strips. Do not scrub or soak incision site. Pat dry. NO tub baths, NO swimming pools. No hot tubs.    Call Dr. Maria Luz Chávez's office at [phone] for an appointment for follow up in 2 weeks.

## 2021-12-21 NOTE — HPI-TODAY'S VISIT:
66-year-old female with a history of hypertension, hyperlipidemia, asthma, obesity, sleep apnea, migraines, upper GI bleed secondary to peptic ulcer disease (2010 in 2014), presents for evaluation of stomach issues.  She was last seen on 1/5/2021 after colonoscopy.  Colonoscopy was only significant for sigmoid diverticulosis.  Repeat colonoscopy is recommended in 5 years due to history of second degree relatives with colon cancer. GERD was well controlled on pantoprazole 40 mg twice daily.  She did have occasional difficulty swallowing pills which was suspected to be associated with a lack of esophageal lubrication.  She was advised to swallow liquid prior to taking medication.  The symptoms persisted, we would consider barium swallow and/or EGD.  She did not recall ever having an EGD in the past due to a "problem with her epiglottis."  IBS-D was well controlled on fiber and probiotic. Labs performed on 12/1/2021 revealed decreased total bilirubin 0.2, normal ALT and AST, elevated alkaline phosphatase of 150, normal calcium.  GGT on 12/16/2021 was normal.   Patient states she had the COVID-19 booster in October and began to feel ill.  She reports a 2 to 3-week span of abdominal pain and overall malaise.  She states she was taking Tylenol, Advil and dicyclomine as needed for the abdominal pain.  She states the abdominal pain did resolve after approximately 3 weeks however she is fell shortly thereafter and twisted her back.  She then continues to take Tylenol and Advil as needed for back pain.  She has also been using Voltaren gel.  She did see the chiropractor to adjust her back.  She saw her PCP who placed her on meloxicam and cyclobenzaprine.  She had a bone density scan approximately year and half ago which was normal.  She states her mother had terrible osteoporosis.  She had a hip replacement several years ago, and they stated her bones were "rock strong."  She discontinued all NSAIDs and Tylenol about a week ago and has been pain-free.  She is having 2-3 mostly solid bowel movements per day.  She continues to take fiber tablets twice daily and a probiotic daily.  She states this regimen has been working well for her.  She denies any blood per rectum or melena.  She denies nausea or vomiting.  She does have GERD which is well controlled with pantoprazole 40 mg twice daily.  She did have 1 isolated episode of dysphagia to solids several months ago and this has since resolved.  She does admit to new onset of shortness of breath starting last Thursday.  She states she feels as if she is "wheezing."  She has not smoked tobacco since she was a teenager.  She does not live with any tobacco smokers.  She is planning to see her primary care doctor next week and plans to present this issue to him.  She is otherwise doing well today.

## 2021-12-25 LAB
A/G RATIO: 1.7
ALBUMIN: 4.5
ALKALINE PHOSPHATASE: 108
ALT (SGPT): 21
AST (SGOT): 22
BILIRUBIN, TOTAL: 0.3
BONE FRACTION:: 49
BUN/CREATININE RATIO: 20
BUN: 17
CALCIUM: 9.7
CARBON DIOXIDE, TOTAL: 23
CHLORIDE: 108
CREATININE: 0.85
EGFR IF AFRICN AM: 83
EGFR IF NONAFRICN AM: 72
GLOBULIN, TOTAL: 2.6
GLUCOSE: 105
INTESTINAL FRAC.:: 2
LIVER FRACTION:: 49
POTASSIUM: 4.2
PROTEIN, TOTAL: 7.1
PTH, INTACT: 26
SODIUM: 142
T-TRANSGLUTAMINASE (TTG) IGA: 3
VITAMIN D, 25-HYDROXY: 35.7

## 2022-01-18 ENCOUNTER — ERX REFILL RESPONSE (OUTPATIENT)
Dept: URBAN - METROPOLITAN AREA CLINIC 113 | Facility: CLINIC | Age: 67
End: 2022-01-18

## 2022-01-18 RX ORDER — DICYCLOMINE HYDROCHLORIDE 10 MG/1
TAKE 2 CAPSULES BY MOUTH 3 TIMES A DAY CAPSULE ORAL
Qty: 180 CAPSULE | Refills: 4 | OUTPATIENT

## 2022-01-18 RX ORDER — DICYCLOMINE HYDROCHLORIDE 10 MG/1
2 CAPSULES CAPSULE ORAL THREE TIMES A DAY
Qty: 180 CAPSULE | Refills: 3 | OUTPATIENT

## 2022-03-27 ENCOUNTER — WEB ENCOUNTER (OUTPATIENT)
Dept: URBAN - METROPOLITAN AREA CLINIC 113 | Facility: CLINIC | Age: 67
End: 2022-03-27

## 2022-03-28 ENCOUNTER — OFFICE VISIT (OUTPATIENT)
Dept: URBAN - METROPOLITAN AREA CLINIC 113 | Facility: CLINIC | Age: 67
End: 2022-03-28
Payer: COMMERCIAL

## 2022-03-28 VITALS
HEART RATE: 73 BPM | TEMPERATURE: 98.4 F | BODY MASS INDEX: 42.11 KG/M2 | SYSTOLIC BLOOD PRESSURE: 141 MMHG | HEIGHT: 66 IN | DIASTOLIC BLOOD PRESSURE: 74 MMHG | WEIGHT: 262 LBS

## 2022-03-28 DIAGNOSIS — R74.8 ELEVATED ALKALINE PHOSPHATASE LEVEL: ICD-10-CM

## 2022-03-28 DIAGNOSIS — K57.30 COLON, DIVERTICULOSIS: ICD-10-CM

## 2022-03-28 DIAGNOSIS — K21.9 GASTROESOPHAGEAL REFLUX DISEASE, UNSPECIFIED WHETHER ESOPHAGITIS PRESENT: ICD-10-CM

## 2022-03-28 DIAGNOSIS — Z80.0 FAMILY HISTORY OF COLON CANCER: ICD-10-CM

## 2022-03-28 DIAGNOSIS — R13.19 ESOPHAGEAL DYSPHAGIA: ICD-10-CM

## 2022-03-28 DIAGNOSIS — K58.0 IRRITABLE BOWEL SYNDROME WITH DIARRHEA: ICD-10-CM

## 2022-03-28 PROCEDURE — 99214 OFFICE O/P EST MOD 30 MIN: CPT | Performed by: INTERNAL MEDICINE

## 2022-03-28 RX ORDER — ZOLPIDEM TARTRATE 10 MG/1
1 TABLET AT BEDTIME AS NEEDED TABLET, FILM COATED ORAL ONCE A DAY
Status: ON HOLD | COMMUNITY

## 2022-03-28 RX ORDER — PRAVASTATIN SODIUM 40 MG/1
TAKE 1 TABLET DAILY AS DIRECTED TABLET ORAL
Refills: 0 | Status: ACTIVE | COMMUNITY
Start: 2018-03-07

## 2022-03-28 RX ORDER — RIZATRIPTAN BENZOATE 10 MG/1
TAKE 1 TABLET AT ONSET OF HEADACHE. MAY REPEAT EVERY 2 HOURS AS NEEDED. MAXIMUM 3 TABLETS IN 24 HOURS TABLET ORAL
Refills: 0 | Status: ACTIVE | COMMUNITY
Start: 2018-09-26

## 2022-03-28 RX ORDER — GLUCOSAM/MSM/C/MAN/WILLOW/GING 500-333.3
TAKE AS DIRECTED TABLET ORAL
Refills: 0 | Status: ACTIVE | COMMUNITY

## 2022-03-28 RX ORDER — SENNOSIDES 8.6 MG
TAKE 1 CAPSULE DAILY WITH A MEAL TABLET ORAL
Refills: 0 | Status: ACTIVE | COMMUNITY

## 2022-03-28 RX ORDER — FLUOXETINE HYDROCHLORIDE 40 MG/1
TAKE 1 CAPSULE DAILY CAPSULE ORAL
Refills: 0 | Status: ACTIVE | COMMUNITY
Start: 2018-04-18

## 2022-03-28 RX ORDER — PREGABALIN 100 MG/1
1 CAPSULE CAPSULE ORAL TWICE DAILY
Status: ACTIVE | COMMUNITY

## 2022-03-28 RX ORDER — DICYCLOMINE HYDROCHLORIDE 10 MG/1
TAKE 2 CAPSULES BY MOUTH 3 TIMES A DAY CAPSULE ORAL
Qty: 180 CAPSULE | Refills: 4 | Status: ACTIVE | COMMUNITY

## 2022-03-28 RX ORDER — MINOCYCLINE HYDROCHLORIDE 100 MG/1
1 TABLET TABLET, FILM COATED ORAL
Refills: 0 | Status: ACTIVE | COMMUNITY
Start: 2019-01-02

## 2022-03-28 RX ORDER — PSYLLIUM HUSK 0.4 G
USE AS DIRECTED CAPSULE ORAL
Refills: 0 | Status: ACTIVE | COMMUNITY

## 2022-03-28 RX ORDER — VITAMIN E 200 UNIT
AS DIRECTED CAPSULE ORAL
Status: ACTIVE | COMMUNITY

## 2022-03-28 RX ORDER — PANTOPRAZOLE SODIUM 40 MG/1
TAKE ONE TABLET TWICE A DAY TABLET, DELAYED RELEASE ORAL TWICE DAILY
Qty: 180 | Refills: 3 | Status: ACTIVE | COMMUNITY
Start: 2018-03-07

## 2022-03-28 RX ORDER — TOPIRAMATE 50 MG/1
TAKE 1 TABLET TWICE DAILY TABLET, FILM COATED ORAL
Refills: 0 | Status: ACTIVE | COMMUNITY
Start: 2018-03-07

## 2022-03-28 RX ORDER — LOSARTAN POTASSIUM AND HYDROCHLOROTHIAZIDE 100; 25 MG/1; MG/1
TAKE 1 TABLET DAILY TABLET, FILM COATED ORAL
Refills: 0 | Status: ACTIVE | COMMUNITY
Start: 2018-03-07

## 2022-03-28 NOTE — HPI-TODAY'S VISIT:
66-year-old female presenting for follow-up.  She was last seen in clinic in December 2021. At that time she was having complaints of dysphagia, intermittent GERD, and diarrhea.  She was taking magnesium supplements for migraine headaches and she was instructed to decrease the use of this.   She did not make any changes to her magnesium and her bowel habits have returned back to normal.  She has solid bowel movements.   She also had resolution of her GERD and dysphagia as well.   12/21/21 66-year-old female with a history of hypertension, hyperlipidemia, asthma, obesity, sleep apnea, migraines, upper GI bleed secondary to peptic ulcer disease (2010 in 2014), presents for evaluation of stomach issues.  She was last seen on 1/5/2021 after colonoscopy.  Colonoscopy was only significant for sigmoid diverticulosis.  Repeat colonoscopy is recommended in 5 years due to history of second degree relatives with colon cancer. GERD was well controlled on pantoprazole 40 mg twice daily.  She did have occasional difficulty swallowing pills which was suspected to be associated with a lack of esophageal lubrication.  She was advised to swallow liquid prior to taking medication.  The symptoms persisted, we would consider barium swallow and/or EGD.  She did not recall ever having an EGD in the past due to a "problem with her epiglottis."  IBS-D was well controlled on fiber and probiotic. Labs performed on 12/1/2021 revealed decreased total bilirubin 0.2, normal ALT and AST, elevated alkaline phosphatase of 150, normal calcium.  GGT on 12/16/2021 was normal.   Patient states she had the COVID-19 booster in October and began to feel ill.  She reports a 2 to 3-week span of abdominal pain and overall malaise.  She states she was taking Tylenol, Advil and dicyclomine as needed for the abdominal pain.  She states the abdominal pain did resolve after approximately 3 weeks however she is fell shortly thereafter and twisted her back.  She then continues to take Tylenol and Advil as needed for back pain.  She has also been using Voltaren gel.  She did see the chiropractor to adjust her back.  She saw her PCP who placed her on meloxicam and cyclobenzaprine.  She had a bone density scan approximately year and half ago which was normal.  She states her mother had terrible osteoporosis.  She had a hip replacement several years ago, and they stated her bones were "rock strong."  She discontinued all NSAIDs and Tylenol about a week ago and has been pain-free.  She is having 2-3 mostly solid bowel movements per day.  She continues to take fiber tablets twice daily and a probiotic daily.  She states this regimen has been working well for her.  She denies any blood per rectum or melena.  She denies nausea or vomiting.  She does have GERD which is well controlled with pantoprazole 40 mg twice daily.  She did have 1 isolated episode of dysphagia to solids several months ago and this has since resolved.  She does admit to new onset of shortness of breath starting last Thursday.  She states she feels as if she is "wheezing."  She has not smoked tobacco since she was a teenager.  She does not live with any tobacco smokers.  She is planning to see her primary care doctor next week and plans to present this issue to him.  She is otherwise doing well today. This is a 66-year-old female with a history of hypertension, hyperlipidemia, asthma, obesity, sleep apnea, migraines, upper GI bleed secondary to peptic ulcer disease (2010 in 2014), GERD, diarrhea predominant irritable bowel syndrome, and a family history of colon cancer and 2 second-degree relatives due screening in 2025 presenting for follow-up. She was last seen 1/5/2021.  GERD symptoms were well controlled on pantoprazole 40 mg twice a day.  She reported occasional difficulty swallowing pills.  It was suspected this was associated with lack of esophageal lubrication.  She denied difficulty swallowing food.  She reported a prior attempted EGD during hospitalization for significant upper GI bleed.  Records were requested for review.  She was instructed to swallow liquid prior to taking medication.  Barium swallow and EGD for future considerations.  IBS was controlled with fiber and a probiotic. EGD 2/15/2014:A large clot was identified over a gastric ulcer.  The clot was not disturbed.  Duodenum was unremarkable.

## 2022-03-28 NOTE — HPI-OTHER HISTORIES
Hospital mission at [facility] on 2/14/2014:Patient presented with GI bleed.  EGD performed at that time found a gastric ulcer.  Patient was transfused with 7 units of blood total.  She underwent rescoping and liver blood requirements and a scab was present.  It was stable.  Hemoglobin remained stable after second EGD biopsy from EGD revealed unremarkable gastric mucosa with slight stromal fibrosis.

## 2022-04-20 ENCOUNTER — ERX REFILL RESPONSE (OUTPATIENT)
Dept: URBAN - METROPOLITAN AREA CLINIC 113 | Facility: CLINIC | Age: 67
End: 2022-04-20

## 2022-04-20 RX ORDER — DICYCLOMINE HYDROCHLORIDE 10 MG/1
TAKE 2 CAPSULES BY MOUTH 3 TIMES A DAY CAPSULE ORAL
Qty: 540 CAPSULE | Refills: 2 | OUTPATIENT

## 2022-04-20 RX ORDER — DICYCLOMINE HYDROCHLORIDE 10 MG/1
TAKE 2 CAPSULES BY MOUTH 3 TIMES A DAY CAPSULE ORAL
Qty: 180 CAPSULE | Refills: 4 | OUTPATIENT

## 2022-09-06 ENCOUNTER — OFFICE VISIT (OUTPATIENT)
Dept: URBAN - METROPOLITAN AREA CLINIC 113 | Facility: CLINIC | Age: 67
End: 2022-09-06
Payer: COMMERCIAL

## 2022-09-06 VITALS
BODY MASS INDEX: 42.91 KG/M2 | RESPIRATION RATE: 22 BRPM | TEMPERATURE: 97.3 F | HEART RATE: 69 BPM | DIASTOLIC BLOOD PRESSURE: 90 MMHG | SYSTOLIC BLOOD PRESSURE: 139 MMHG | HEIGHT: 66 IN | WEIGHT: 267 LBS

## 2022-09-06 DIAGNOSIS — R68.81 EARLY SATIETY: ICD-10-CM

## 2022-09-06 DIAGNOSIS — R10.84 GENERALIZED ABDOMINAL PAIN: ICD-10-CM

## 2022-09-06 DIAGNOSIS — R19.4 CHANGE IN BOWEL HABITS: ICD-10-CM

## 2022-09-06 PROCEDURE — 99214 OFFICE O/P EST MOD 30 MIN: CPT | Performed by: INTERNAL MEDICINE

## 2022-09-06 RX ORDER — FLUOXETINE HYDROCHLORIDE 40 MG/1
TAKE 1 CAPSULE DAILY CAPSULE ORAL
Refills: 0 | Status: ACTIVE | COMMUNITY
Start: 2018-04-18

## 2022-09-06 RX ORDER — PSYLLIUM HUSK 0.4 G
USE AS DIRECTED CAPSULE ORAL
Refills: 0 | Status: ACTIVE | COMMUNITY

## 2022-09-06 RX ORDER — RIZATRIPTAN BENZOATE 10 MG/1
TAKE 1 TABLET AT ONSET OF HEADACHE. MAY REPEAT EVERY 2 HOURS AS NEEDED. MAXIMUM 3 TABLETS IN 24 HOURS TABLET ORAL
Refills: 0 | Status: ACTIVE | COMMUNITY
Start: 2018-09-26

## 2022-09-06 RX ORDER — POTASSIUM CHLORIDE 750 MG/1
1 CAPSULE WITH FOOD CAPSULE, EXTENDED RELEASE ORAL TWICE A DAY
Status: ACTIVE | COMMUNITY

## 2022-09-06 RX ORDER — DICYCLOMINE HYDROCHLORIDE 10 MG/1
TAKE 2 CAPSULES BY MOUTH 3 TIMES A DAY CAPSULE ORAL
Qty: 540 CAPSULE | Refills: 2 | Status: ON HOLD | COMMUNITY

## 2022-09-06 RX ORDER — VITAMIN E 200 UNIT
AS DIRECTED CAPSULE ORAL
Status: ACTIVE | COMMUNITY

## 2022-09-06 RX ORDER — PREGABALIN 100 MG/1
1 CAPSULE CAPSULE ORAL TWICE DAILY
Status: ACTIVE | COMMUNITY

## 2022-09-06 RX ORDER — PANTOPRAZOLE SODIUM 40 MG/1
TAKE ONE TABLET TWICE A DAY TABLET, DELAYED RELEASE ORAL TWICE DAILY
Qty: 180 | Refills: 3 | Status: ACTIVE | COMMUNITY
Start: 2018-03-07

## 2022-09-06 RX ORDER — LEVOCETIRIZINE DIHYDROCHLORIDE 5 MG/1
1 TABLET IN THE EVENING TABLET ORAL ONCE A DAY
Status: ACTIVE | COMMUNITY

## 2022-09-06 RX ORDER — SENNOSIDES 8.6 MG
TAKE 1 CAPSULE DAILY WITH A MEAL TABLET ORAL
Refills: 0 | Status: ACTIVE | COMMUNITY

## 2022-09-06 RX ORDER — PRAVASTATIN SODIUM 40 MG/1
TAKE 1 TABLET DAILY AS DIRECTED TABLET ORAL
Refills: 0 | Status: ON HOLD | COMMUNITY
Start: 2018-03-07

## 2022-09-06 RX ORDER — LOSARTAN POTASSIUM AND HYDROCHLOROTHIAZIDE 100; 25 MG/1; MG/1
TAKE 1 TABLET DAILY TABLET, FILM COATED ORAL
Refills: 0 | Status: ACTIVE | COMMUNITY
Start: 2018-03-07

## 2022-09-06 RX ORDER — GLUCOSAM/MSM/C/MAN/WILLOW/GING 500-333.3
TAKE AS DIRECTED TABLET ORAL
Refills: 0 | Status: ACTIVE | COMMUNITY

## 2022-09-06 RX ORDER — ZOLPIDEM TARTRATE 10 MG/1
1 TABLET AT BEDTIME AS NEEDED TABLET, FILM COATED ORAL ONCE A DAY
Status: ON HOLD | COMMUNITY

## 2022-09-06 RX ORDER — TOPIRAMATE 50 MG/1
TAKE 1 TABLET TWICE DAILY TABLET, FILM COATED ORAL
Refills: 0 | Status: ACTIVE | COMMUNITY
Start: 2018-03-07

## 2022-09-06 RX ORDER — MINOCYCLINE HYDROCHLORIDE 100 MG/1
1 TABLET TABLET, FILM COATED ORAL
Refills: 0 | Status: ACTIVE | COMMUNITY
Start: 2019-01-02

## 2022-09-06 NOTE — HPI-TODAY'S VISIT:
66-year-old female with a history of hypertension, hyperlipidemia, asthma, obesity, sleep apnea, migraines, upper GI bleed secondary to peptic ulcer disease, GERD, diverticulosis, presenting for evaluation of She was last seen in the office in March for follow-up regarding intermittent diarrhea, which have resolved with use of a daily fiber supplement and probiotic.  Her GERD symptoms were managed with pantoprazole. She returns today with recurrent symptoms. Since October of last year, she has experienced intermittent episodes of numerous vague symptoms which have occurred about every 2-3 months. She states she will start with what resembles cold symptoms, followed by a complete loss of appetite. She is unable to eat for several days. Once she feels she can tolerate oral intake, she can only eat very small portions due to nausea and early satiety. This lasts for 3-4 days, where she is only able to tolerate food such as rice, eggs, or chicken noodle soup. During this time, she will experience diffuse abdominal soreness and indigestion, which are alleviated with dicyclomine. She will have looser stools, which respond to an increase in Metamucil. The symptoms will ultimately subside and she is asymptomatic until the next episode. She denies blood in the stool, nocturnal defecation, or vomiting during the episodes.  Her bowel habits are regular with Metamucil and she denies abdominal pain between episodes mentioned above. Her reflux symptoms are typically well managed with twice daily pantoprazole.

## 2022-10-04 ENCOUNTER — TELEPHONE ENCOUNTER (OUTPATIENT)
Dept: URBAN - METROPOLITAN AREA CLINIC 113 | Facility: CLINIC | Age: 67
End: 2022-10-04

## 2022-11-03 ENCOUNTER — OFFICE VISIT (OUTPATIENT)
Dept: URBAN - METROPOLITAN AREA CLINIC 113 | Facility: CLINIC | Age: 67
End: 2022-11-03
Payer: COMMERCIAL

## 2022-11-03 VITALS
WEIGHT: 245 LBS | BODY MASS INDEX: 39.37 KG/M2 | SYSTOLIC BLOOD PRESSURE: 141 MMHG | HEIGHT: 66 IN | RESPIRATION RATE: 20 BRPM | DIASTOLIC BLOOD PRESSURE: 84 MMHG | TEMPERATURE: 97 F | HEART RATE: 84 BPM

## 2022-11-03 DIAGNOSIS — K57.30 COLON, DIVERTICULOSIS: ICD-10-CM

## 2022-11-03 DIAGNOSIS — R19.4 CHANGE IN BOWEL HABITS: ICD-10-CM

## 2022-11-03 DIAGNOSIS — R13.19 ESOPHAGEAL DYSPHAGIA: ICD-10-CM

## 2022-11-03 DIAGNOSIS — K21.9 GASTROESOPHAGEAL REFLUX DISEASE, UNSPECIFIED WHETHER ESOPHAGITIS PRESENT: ICD-10-CM

## 2022-11-03 DIAGNOSIS — Z80.0 FAMILY HISTORY OF COLON CANCER: ICD-10-CM

## 2022-11-03 DIAGNOSIS — K58.0 IRRITABLE BOWEL SYNDROME WITH DIARRHEA: ICD-10-CM

## 2022-11-03 DIAGNOSIS — R68.81 EARLY SATIETY: ICD-10-CM

## 2022-11-03 DIAGNOSIS — K85.90 ACUTE PANCREATITIS WITHOUT INFECTION OR NECROSIS, UNSPECIFIED PANCREATITIS TYPE: ICD-10-CM

## 2022-11-03 DIAGNOSIS — R74.8 ELEVATED ALKALINE PHOSPHATASE LEVEL: ICD-10-CM

## 2022-11-03 DIAGNOSIS — R10.84 GENERALIZED ABDOMINAL PAIN: ICD-10-CM

## 2022-11-03 PROCEDURE — 99214 OFFICE O/P EST MOD 30 MIN: CPT | Performed by: INTERNAL MEDICINE

## 2022-11-03 RX ORDER — FLUOXETINE HYDROCHLORIDE 40 MG/1
TAKE 1 CAPSULE DAILY CAPSULE ORAL
Refills: 0 | Status: ACTIVE | COMMUNITY
Start: 2018-04-18

## 2022-11-03 RX ORDER — RIZATRIPTAN BENZOATE 10 MG/1
TAKE 1 TABLET AT ONSET OF HEADACHE. MAY REPEAT EVERY 2 HOURS AS NEEDED. MAXIMUM 3 TABLETS IN 24 HOURS TABLET ORAL
Refills: 0 | Status: ACTIVE | COMMUNITY
Start: 2018-09-26

## 2022-11-03 RX ORDER — DICYCLOMINE HYDROCHLORIDE 10 MG/1
TAKE 2 CAPSULES BY MOUTH 3 TIMES A DAY CAPSULE ORAL
Qty: 540 CAPSULE | Refills: 2 | Status: ON HOLD | COMMUNITY

## 2022-11-03 RX ORDER — GLUCOSAM/MSM/C/MAN/WILLOW/GING 500-333.3
TAKE AS DIRECTED TABLET ORAL
Refills: 0 | Status: ACTIVE | COMMUNITY

## 2022-11-03 RX ORDER — PSYLLIUM HUSK 0.4 G
USE AS DIRECTED CAPSULE ORAL
Refills: 0 | Status: ACTIVE | COMMUNITY

## 2022-11-03 RX ORDER — LEVOCETIRIZINE DIHYDROCHLORIDE 5 MG/1
1 TABLET IN THE EVENING TABLET ORAL ONCE A DAY
Status: ACTIVE | COMMUNITY

## 2022-11-03 RX ORDER — PRAVASTATIN SODIUM 40 MG/1
TAKE 1 TABLET DAILY AS DIRECTED TABLET ORAL
Refills: 0 | Status: ON HOLD | COMMUNITY
Start: 2018-03-07

## 2022-11-03 RX ORDER — MINOCYCLINE HYDROCHLORIDE 100 MG/1
1 TABLET TABLET, FILM COATED ORAL
Refills: 0 | Status: ACTIVE | COMMUNITY
Start: 2019-01-02

## 2022-11-03 RX ORDER — TOPIRAMATE 50 MG/1
TAKE 1 TABLET TWICE DAILY TABLET, FILM COATED ORAL
Refills: 0 | Status: ON HOLD | COMMUNITY
Start: 2018-03-07

## 2022-11-03 RX ORDER — PREGABALIN 100 MG/1
1 CAPSULE CAPSULE ORAL TWICE DAILY
Status: ACTIVE | COMMUNITY

## 2022-11-03 RX ORDER — PANTOPRAZOLE SODIUM 40 MG/1
TAKE ONE TABLET TWICE A DAY TABLET, DELAYED RELEASE ORAL TWICE DAILY
Qty: 180 | Refills: 3 | Status: ACTIVE | COMMUNITY
Start: 2018-03-07

## 2022-11-03 RX ORDER — ZOLPIDEM TARTRATE 10 MG/1
1 TABLET AT BEDTIME AS NEEDED TABLET, FILM COATED ORAL ONCE A DAY
Status: ON HOLD | COMMUNITY

## 2022-11-03 RX ORDER — HYDROCHLOROTHIAZIDE 25 MG/1
1 TABLET IN THE MORNING TABLET ORAL ONCE A DAY
Status: ACTIVE | COMMUNITY

## 2022-11-03 RX ORDER — POTASSIUM CHLORIDE 750 MG/1
1 CAPSULE WITH FOOD CAPSULE, EXTENDED RELEASE ORAL TWICE A DAY
Status: ACTIVE | COMMUNITY

## 2022-11-03 RX ORDER — VITAMIN E 200 UNIT
AS DIRECTED CAPSULE ORAL
Status: ACTIVE | COMMUNITY

## 2022-11-03 RX ORDER — LOSARTAN POTASSIUM AND HYDROCHLOROTHIAZIDE 100; 25 MG/1; MG/1
TAKE 1 TABLET DAILY TABLET, FILM COATED ORAL
Refills: 0 | Status: ON HOLD | COMMUNITY
Start: 2018-03-07

## 2022-11-03 RX ORDER — SENNOSIDES 8.6 MG
TAKE 1 CAPSULE DAILY WITH A MEAL TABLET ORAL
Refills: 0 | Status: ACTIVE | COMMUNITY

## 2022-11-03 RX ORDER — ERENUMAB-AOOE 140 MG/ML
AS DIRECTED INJECTION, SOLUTION SUBCUTANEOUS
Status: ACTIVE | COMMUNITY

## 2022-11-03 NOTE — HPI-TODAY'S VISIT:
66-year-old female presenting for follow-up.  She was last seen in September 2022. At that time she was having intermittent abdominal pain and had a CT scan performed.  She was found have a partially collapsed urinary bladder which was grossly unremarkable.  She also had a 4 x 3.7 x 3.4 cm left adnexal lesion which was likely compatible with dermoid cyst. She had a repeat CT scan performed on October 4, 2022 Which demonstrated fat stranding around the pancreatic head and parker hepatis concerning for acute pancreatitis. She had an abdominal ultrasound which demonstrated no biliary dilation in her common bile duct was normal at 0.5.  Her gallbladder was also normal.  She does not drink much alcohol. She did not have any before these events. She does still have abdominal pain which is tyically accross the middle of her abdomen. She did have nausea/emesis but none now. She denies any family hx of pancreatitis.   9/6/22 66-year-old female with a history of hypertension, hyperlipidemia, asthma, obesity, sleep apnea, migraines, upper GI bleed secondary to peptic ulcer disease, GERD, diverticulosis, presenting for evaluation of She was last seen in the office in March for follow-up regarding intermittent diarrhea, which have resolved with use of a daily fiber supplement and probiotic.  Her GERD symptoms were managed with pantoprazole. She returns today with recurrent symptoms. Since October of last year, she has experienced intermittent episodes of numerous vague symptoms which have occurred about every 2-3 months. She states she will start with what resembles cold symptoms, followed by a complete loss of appetite. She is unable to eat for several days. Once she feels she can tolerate oral intake, she can only eat very small portions due to nausea and early satiety. This lasts for 3-4 days, where she is only able to tolerate food such as rice, eggs, or chicken noodle soup. During this time, she will experience diffuse abdominal soreness and indigestion, which are alleviated with dicyclomine. She will have looser stools, which respond to an increase in Metamucil. The symptoms will ultimately subside and she is asymptomatic until the next episode. She denies blood in the stool, nocturnal defecation, or vomiting during the episodes.  Her bowel habits are regular with Metamucil and she denies abdominal pain between episodes mentioned above. Her reflux symptoms are typically well managed with twice daily pantoprazole.

## 2022-11-07 LAB
A/G RATIO: 1.7
ALBUMIN: 4.5
ALKALINE PHOSPHATASE: 81
ALT (SGPT): 17
AST (SGOT): 21
BILIRUBIN, TOTAL: 0.6
BUN/CREATININE RATIO: (no result)
BUN: 13
CALCIUM: 9.5
CARBON DIOXIDE, TOTAL: 23
CHLORIDE: 100
CREATININE: 0.91
EGFR: 70
GLOBULIN, TOTAL: 2.7
GLUCOSE: 105
IGG, SUBCLASS 4: 28.8
LIPASE: 21
POTASSIUM: 3.3
PROTEIN, TOTAL: 7.2
SODIUM: 135

## 2022-12-12 ENCOUNTER — OFFICE VISIT (OUTPATIENT)
Dept: URBAN - METROPOLITAN AREA CLINIC 113 | Facility: CLINIC | Age: 67
End: 2022-12-12
Payer: COMMERCIAL

## 2022-12-12 VITALS
RESPIRATION RATE: 16 BRPM | SYSTOLIC BLOOD PRESSURE: 135 MMHG | BODY MASS INDEX: 40.18 KG/M2 | TEMPERATURE: 97.5 F | WEIGHT: 250 LBS | DIASTOLIC BLOOD PRESSURE: 77 MMHG | HEIGHT: 66 IN | HEART RATE: 68 BPM

## 2022-12-12 DIAGNOSIS — K82.8 BILIARY DYSKINESIA: ICD-10-CM

## 2022-12-12 DIAGNOSIS — Z80.0 FAMILY HISTORY OF COLON CANCER: ICD-10-CM

## 2022-12-12 DIAGNOSIS — K58.0 IRRITABLE BOWEL SYNDROME WITH DIARRHEA: ICD-10-CM

## 2022-12-12 DIAGNOSIS — R10.84 GENERALIZED ABDOMINAL PAIN: ICD-10-CM

## 2022-12-12 DIAGNOSIS — K57.30 COLON, DIVERTICULOSIS: ICD-10-CM

## 2022-12-12 DIAGNOSIS — R74.8 ELEVATED ALKALINE PHOSPHATASE LEVEL: ICD-10-CM

## 2022-12-12 DIAGNOSIS — K21.9 GASTROESOPHAGEAL REFLUX DISEASE, UNSPECIFIED WHETHER ESOPHAGITIS PRESENT: ICD-10-CM

## 2022-12-12 DIAGNOSIS — R68.81 EARLY SATIETY: ICD-10-CM

## 2022-12-12 DIAGNOSIS — K85.90 ACUTE PANCREATITIS WITHOUT INFECTION OR NECROSIS, UNSPECIFIED PANCREATITIS TYPE: ICD-10-CM

## 2022-12-12 DIAGNOSIS — R19.4 CHANGE IN BOWEL HABITS: ICD-10-CM

## 2022-12-12 DIAGNOSIS — R13.19 ESOPHAGEAL DYSPHAGIA: ICD-10-CM

## 2022-12-12 PROCEDURE — 99214 OFFICE O/P EST MOD 30 MIN: CPT

## 2022-12-12 RX ORDER — MINOCYCLINE HYDROCHLORIDE 100 MG/1
1 TABLET TABLET, FILM COATED ORAL
Refills: 0 | Status: ACTIVE | COMMUNITY
Start: 2019-01-02

## 2022-12-12 RX ORDER — VITAMIN E 200 UNIT
AS DIRECTED CAPSULE ORAL
Status: ACTIVE | COMMUNITY

## 2022-12-12 RX ORDER — POTASSIUM CHLORIDE 750 MG/1
1 CAPSULE WITH FOOD CAPSULE, EXTENDED RELEASE ORAL TWICE A DAY
Status: ACTIVE | COMMUNITY

## 2022-12-12 RX ORDER — ERENUMAB-AOOE 140 MG/ML
AS DIRECTED INJECTION, SOLUTION SUBCUTANEOUS
Status: ACTIVE | COMMUNITY

## 2022-12-12 RX ORDER — LOSARTAN POTASSIUM AND HYDROCHLOROTHIAZIDE 100; 25 MG/1; MG/1
TAKE 1 TABLET DAILY TABLET, FILM COATED ORAL
Refills: 0 | Status: ON HOLD | COMMUNITY
Start: 2018-03-07

## 2022-12-12 RX ORDER — RIZATRIPTAN BENZOATE 10 MG/1
TAKE 1 TABLET AT ONSET OF HEADACHE. MAY REPEAT EVERY 2 HOURS AS NEEDED. MAXIMUM 3 TABLETS IN 24 HOURS TABLET ORAL
Refills: 0 | Status: ACTIVE | COMMUNITY
Start: 2018-09-26

## 2022-12-12 RX ORDER — GLUCOSAM/MSM/C/MAN/WILLOW/GING 500-333.3
TAKE AS DIRECTED TABLET ORAL
Refills: 0 | Status: ACTIVE | COMMUNITY

## 2022-12-12 RX ORDER — PRAVASTATIN SODIUM 40 MG/1
TAKE 1 TABLET DAILY AS DIRECTED TABLET ORAL
Refills: 0 | Status: ON HOLD | COMMUNITY
Start: 2018-03-07

## 2022-12-12 RX ORDER — FLUOXETINE HYDROCHLORIDE 40 MG/1
TAKE 1 CAPSULE DAILY CAPSULE ORAL
Refills: 0 | Status: ACTIVE | COMMUNITY
Start: 2018-04-18

## 2022-12-12 RX ORDER — TOPIRAMATE 50 MG/1
TAKE 1 TABLET TWICE DAILY TABLET, FILM COATED ORAL
Refills: 0 | Status: ON HOLD | COMMUNITY
Start: 2018-03-07

## 2022-12-12 RX ORDER — ZOLPIDEM TARTRATE 10 MG/1
1 TABLET AT BEDTIME AS NEEDED TABLET, FILM COATED ORAL ONCE A DAY
Status: ON HOLD | COMMUNITY

## 2022-12-12 RX ORDER — LEVOCETIRIZINE DIHYDROCHLORIDE 5 MG/1
1 TABLET IN THE EVENING TABLET ORAL ONCE A DAY
Status: ACTIVE | COMMUNITY

## 2022-12-12 RX ORDER — PSYLLIUM HUSK 0.4 G
USE AS DIRECTED CAPSULE ORAL
Refills: 0 | Status: ACTIVE | COMMUNITY

## 2022-12-12 RX ORDER — PREGABALIN 100 MG/1
1 CAPSULE CAPSULE ORAL TWICE DAILY
Status: ACTIVE | COMMUNITY

## 2022-12-12 RX ORDER — DICYCLOMINE HYDROCHLORIDE 10 MG/1
TAKE 2 CAPSULES BY MOUTH 3 TIMES A DAY CAPSULE ORAL
Qty: 540 CAPSULE | Refills: 2 | Status: ON HOLD | COMMUNITY

## 2022-12-12 RX ORDER — HYDROCHLOROTHIAZIDE 25 MG/1
1 TABLET IN THE MORNING TABLET ORAL ONCE A DAY
Status: ACTIVE | COMMUNITY

## 2022-12-12 RX ORDER — AMLODIPINE BESYLATE 10 MG/1
1 TABLET TABLET ORAL ONCE A DAY
Status: ACTIVE | COMMUNITY

## 2022-12-12 RX ORDER — PANTOPRAZOLE SODIUM 40 MG/1
TAKE ONE TABLET TWICE A DAY TABLET, DELAYED RELEASE ORAL TWICE DAILY
Qty: 180 | Refills: 3 | Status: ACTIVE | COMMUNITY
Start: 2018-03-07

## 2022-12-12 RX ORDER — SENNOSIDES 8.6 MG
TAKE 1 CAPSULE DAILY WITH A MEAL TABLET ORAL
Refills: 0 | Status: ACTIVE | COMMUNITY

## 2022-12-12 NOTE — HPI-TODAY'S VISIT:
67-year-old female presents for follow-up.  She was last seen on 11/30/2022.  Regarding her recent episode of acute pancreatitis, she was planned for additional labs to include IgG subclass 4, repeat lipase and CMP.  She was also planned for MRCP.  Differential included medication induced pancreatitis from thiazide diuretics or previous losartan.  She did report esophageal dysphagia.  We would consider barium swallow or EGD if this persisted.  Regarding her persistent generalized abdominal pain, she was planned for HIDA scan.  Labs 11/30/2022: Normal lipase glucose 105, potassium 3.3, normal LFTs, normal IgG Subclass 4.  MRCP 12/9/2022: Interval resolution of inflammatory changes about the pancreatic head.  Nonspecific abnormal gallbladder wall thickening without evidence of cholelithiasis or choledocholithiasis.  Mild hepatosplenomegaly.  HIDA scan 12/9/2022: Low gallbladder ejection fraction compatible with dyskinesia/dysmotility.  Gallbladder ejection fraction of 13%.  She has been asymptomatic since her last visit. She feels her symptoms have been brought on by Covid. She has had similar symptoms after receiving the Covid vaccines/boosters. She had recently gotten her fifth booster shot. She does not feel her symptoms were related to food. She is watching what she is eating. She is able to tolerate most foods again. She has had symptoms following consumption of dairy. She has had ETOH a few times over the last week without any issues. She started taking Aimovig in October however problems were ongoing prior to this. She has remained on HCTZ for years. She is planning to start a Mediterranean diet for weight loss.   11/30/22 66-year-old female presenting for follow-up.  She was last seen in September 2022. At that time she was having intermittent abdominal pain and had a CT scan performed.  She was found have a partially collapsed urinary bladder which was grossly unremarkable.  She also had a 4 x 3.7 x 3.4 cm left adnexal lesion which was likely compatible with dermoid cyst. She had a repeat CT scan performed on October 4, 2022, Which demonstrated fat stranding around the pancreatic head and parker hepatis concerning for acute pancreatitis. She had an abdominal ultrasound which demonstrated no biliary dilation in her common bile duct was normal at 0.5.  Her gallbladder was also normal.  She does not drink much alcohol. She did not have any before these events. She does still have abdominal pain which is typically across the middle of her abdomen. She did have nausea/emesis but none now. She denies any family hx of pancreatitis.   9/6/22 66-year-old female with a history of hypertension, hyperlipidemia, asthma, obesity, sleep apnea, migraines, upper GI bleed secondary to peptic ulcer disease, GERD, diverticulosis, presenting for evaluation of She was last seen in the office in March for follow-up regarding intermittent diarrhea, which have resolved with use of a daily fiber supplement and probiotic.  Her GERD symptoms were managed with pantoprazole. She returns today with recurrent symptoms. Since October of last year, she has experienced intermittent episodes of numerous vague symptoms which have occurred about every 2-3 months. She states she will start with what resembles cold symptoms, followed by a complete loss of appetite. She is unable to eat for several days. Once she feels she can tolerate oral intake, she can only eat very small portions due to nausea and early satiety. This lasts for 3-4 days, where she is only able to tolerate food such as rice, eggs, or chicken noodle soup. During this time, she will experience diffuse abdominal soreness and indigestion, which are alleviated with dicyclomine. She will have looser stools, which respond to an increase in Metamucil. The symptoms will ultimately subside, and she is asymptomatic until the next episode. She denies blood in the stool, nocturnal defecation, or vomiting during the episodes.   Her bowel habits are regular with Metamucil, and she denies abdominal pain between episodes mentioned above. Her reflux symptoms are typically well managed with twice daily pantoprazole.

## 2023-02-22 ENCOUNTER — OFFICE VISIT (OUTPATIENT)
Dept: URBAN - METROPOLITAN AREA CLINIC 113 | Facility: CLINIC | Age: 68
End: 2023-02-22
Payer: COMMERCIAL

## 2023-02-22 VITALS
BODY MASS INDEX: 40.02 KG/M2 | RESPIRATION RATE: 18 BRPM | SYSTOLIC BLOOD PRESSURE: 132 MMHG | WEIGHT: 249 LBS | TEMPERATURE: 97.7 F | HEIGHT: 66 IN | DIASTOLIC BLOOD PRESSURE: 78 MMHG | HEART RATE: 76 BPM

## 2023-02-22 DIAGNOSIS — K57.30 COLON, DIVERTICULOSIS: ICD-10-CM

## 2023-02-22 DIAGNOSIS — K85.90 ACUTE PANCREATITIS WITHOUT INFECTION OR NECROSIS, UNSPECIFIED PANCREATITIS TYPE: ICD-10-CM

## 2023-02-22 DIAGNOSIS — K21.9 GASTROESOPHAGEAL REFLUX DISEASE, UNSPECIFIED WHETHER ESOPHAGITIS PRESENT: ICD-10-CM

## 2023-02-22 DIAGNOSIS — R68.81 EARLY SATIETY: ICD-10-CM

## 2023-02-22 DIAGNOSIS — K58.0 IRRITABLE BOWEL SYNDROME WITH DIARRHEA: ICD-10-CM

## 2023-02-22 DIAGNOSIS — R13.19 ESOPHAGEAL DYSPHAGIA: ICD-10-CM

## 2023-02-22 DIAGNOSIS — Z80.0 FAMILY HISTORY OF COLON CANCER: ICD-10-CM

## 2023-02-22 DIAGNOSIS — R19.4 CHANGE IN BOWEL HABITS: ICD-10-CM

## 2023-02-22 DIAGNOSIS — R10.84 GENERALIZED ABDOMINAL PAIN: ICD-10-CM

## 2023-02-22 DIAGNOSIS — K82.8 BILIARY DYSKINESIA: ICD-10-CM

## 2023-02-22 DIAGNOSIS — R74.8 ELEVATED ALKALINE PHOSPHATASE LEVEL: ICD-10-CM

## 2023-02-22 PROBLEM — 197125005: Status: ACTIVE | Noted: 2020-11-15

## 2023-02-22 PROBLEM — 312824007: Status: ACTIVE | Noted: 2021-01-05

## 2023-02-22 PROBLEM — 733657002: Status: ACTIVE | Noted: 2021-01-05

## 2023-02-22 PROBLEM — 235595009: Status: ACTIVE | Noted: 2021-01-05

## 2023-02-22 PROCEDURE — 99214 OFFICE O/P EST MOD 30 MIN: CPT

## 2023-02-22 RX ORDER — SENNOSIDES 8.6 MG
TAKE 1 CAPSULE DAILY WITH A MEAL TABLET ORAL
Refills: 0 | Status: ACTIVE | COMMUNITY

## 2023-02-22 RX ORDER — POTASSIUM CHLORIDE 750 MG/1
1 CAPSULE WITH FOOD CAPSULE, EXTENDED RELEASE ORAL TWICE A DAY
Status: ACTIVE | COMMUNITY

## 2023-02-22 RX ORDER — PREGABALIN 100 MG/1
1 CAPSULE CAPSULE ORAL TWICE DAILY
Status: ACTIVE | COMMUNITY

## 2023-02-22 RX ORDER — TOPIRAMATE 50 MG/1
TAKE 1 TABLET TWICE DAILY TABLET, FILM COATED ORAL
Refills: 0 | Status: ON HOLD | COMMUNITY
Start: 2018-03-07

## 2023-02-22 RX ORDER — LEVOCETIRIZINE DIHYDROCHLORIDE 5 MG/1
1 TABLET IN THE EVENING TABLET ORAL ONCE A DAY
Status: ACTIVE | COMMUNITY

## 2023-02-22 RX ORDER — AMLODIPINE BESYLATE 10 MG/1
1 TABLET TABLET ORAL ONCE A DAY
Status: ACTIVE | COMMUNITY

## 2023-02-22 RX ORDER — RIZATRIPTAN BENZOATE 10 MG/1
TAKE 1 TABLET AT ONSET OF HEADACHE. MAY REPEAT EVERY 2 HOURS AS NEEDED. MAXIMUM 3 TABLETS IN 24 HOURS TABLET ORAL
Refills: 0 | Status: ACTIVE | COMMUNITY
Start: 2018-09-26

## 2023-02-22 RX ORDER — PRAVASTATIN SODIUM 40 MG/1
TAKE 1 TABLET DAILY AS DIRECTED TABLET ORAL
Refills: 0 | Status: ON HOLD | COMMUNITY
Start: 2018-03-07

## 2023-02-22 RX ORDER — ZOLPIDEM TARTRATE 10 MG/1
1 TABLET AT BEDTIME AS NEEDED TABLET, FILM COATED ORAL ONCE A DAY
Status: ON HOLD | COMMUNITY

## 2023-02-22 RX ORDER — GLUCOSAM/MSM/C/MAN/WILLOW/GING 500-333.3
TAKE AS DIRECTED TABLET ORAL
Refills: 0 | Status: ACTIVE | COMMUNITY

## 2023-02-22 RX ORDER — MINOCYCLINE HYDROCHLORIDE 100 MG/1
1 TABLET TABLET, FILM COATED ORAL
Refills: 0 | Status: ACTIVE | COMMUNITY
Start: 2019-01-02

## 2023-02-22 RX ORDER — ERENUMAB-AOOE 140 MG/ML
AS DIRECTED INJECTION, SOLUTION SUBCUTANEOUS
Status: ACTIVE | COMMUNITY

## 2023-02-22 RX ORDER — PANTOPRAZOLE SODIUM 40 MG/1
TAKE ONE TABLET TWICE A DAY TABLET, DELAYED RELEASE ORAL TWICE DAILY
Qty: 180 | Refills: 3 | Status: ACTIVE | COMMUNITY
Start: 2018-03-07

## 2023-02-22 RX ORDER — VITAMIN E 200 UNIT
AS DIRECTED CAPSULE ORAL
Status: ACTIVE | COMMUNITY

## 2023-02-22 RX ORDER — LOSARTAN POTASSIUM AND HYDROCHLOROTHIAZIDE 100; 25 MG/1; MG/1
TAKE 1 TABLET DAILY TABLET, FILM COATED ORAL
Refills: 0 | Status: ON HOLD | COMMUNITY
Start: 2018-03-07

## 2023-02-22 RX ORDER — HYDROCHLOROTHIAZIDE 25 MG/1
1 TABLET IN THE MORNING TABLET ORAL ONCE A DAY
Status: ACTIVE | COMMUNITY

## 2023-02-22 RX ORDER — DICYCLOMINE HYDROCHLORIDE 10 MG/1
TAKE 2 CAPSULES BY MOUTH 3 TIMES A DAY CAPSULE ORAL
Qty: 540 CAPSULE | Refills: 2 | Status: ON HOLD | COMMUNITY

## 2023-02-22 RX ORDER — PSYLLIUM HUSK 0.4 G
USE AS DIRECTED CAPSULE ORAL
Refills: 0 | Status: ACTIVE | COMMUNITY

## 2023-02-22 RX ORDER — FLUOXETINE HYDROCHLORIDE 40 MG/1
TAKE 1 CAPSULE DAILY CAPSULE ORAL
Refills: 0 | Status: ACTIVE | COMMUNITY
Start: 2018-04-18

## 2023-02-22 NOTE — HPI-TODAY'S VISIT:
67-year-old female presents for follow-up.  She was last seen on 12/12/2022.  She had a recent episode of acute pancreatitis.  Repeat labs revealed normal LFTs, lipase, CBC and IgG4.  MRCP revealed resolving inflammation of the pancreas though did reveal gallbladder wall thickening.  Follow-up HIDA scan revealed marked biliary dyskinesia.  Question whether this was cause for pancreatitis.  She was referred to surgery.  She did also report a change in bowel habits from diarrhea predominant constipation predominant ongoing since October.  She was recommended to restart fiber.  If no improvement we would consider MiraLAX with titration to effect.  Of note colonoscopy in 2020 was normal. It is unclear whether she was seen by Dr. Schwartz.   Labs 1/5/2023: negative urinalysis, normal CBC, Hgb A1c 5.3, normal CMP, normal thyroid studies, cholesterol 284, triglycerides 221, DLDL 197.   It appears patient was again referred to general surgery by PCP in January. She is scheduled for surgery on 3/20 with Dr. Darlene Zapien. She denies nausea or vomiting. She denies any episodes of severe abdominal pain similar to her episode of pancreatitis. She is losing weight slowly. Her stools have returned to diarrhea predominant. THis is her baseline. She does have urgency and occasional incontinence with passing gas. She takes 3 fiber capsules daily. She is also taking a daily probiotic. She uses Imodium maybe 3 times per week for maintenance. She does have dysphagia to pills. She admits to taking over 5 pills at one time. Some of the pills will become stuck at the top of her throat then pass with a second sip of water. She had a CT calcium score. She has since started a statin medication.   12/12/22 67-year-old female presents for follow-up.  She was last seen on 11/30/2022.  Regarding her recent episode of acute pancreatitis, she was planned for additional labs to include IgG subclass 4, repeat lipase and CMP.  She was also planned for MRCP.  Differential included medication induced pancreatitis from thiazide diuretics or previous losartan.  She did report esophageal dysphagia.  We would consider barium swallow or EGD if this persisted.  Regarding her persistent generalized abdominal pain, she was planned for HIDA scan.  Labs 11/30/2022: Normal lipase glucose 105, potassium 3.3, normal LFTs, normal IgG Subclass 4.  MRCP 12/9/2022: Interval resolution of inflammatory changes about the pancreatic head.  Nonspecific abnormal gallbladder wall thickening without evidence of cholelithiasis or choledocholithiasis.  Mild hepatosplenomegaly.  HIDA scan 12/9/2022: Low gallbladder ejection fraction compatible with dyskinesia/dysmotility.  Gallbladder ejection fraction of 13%.  She has been asymptomatic since her last visit. She feels her symptoms have been brought on by Covid. She has had similar symptoms after receiving the Covid vaccines/boosters. She had recently gotten her fifth booster shot. She does not feel her symptoms were related to food. She is watching what she is eating. She is able to tolerate most foods again. She has had symptoms following consumption of dairy. She has had ETOH a few times over the last week without any issues. She started taking Aimovig in October however problems were ongoing prior to this. She has remained on HCTZ for years. She is planning to start a Mediterranean diet for weight loss.   11/30/22 66-year-old female presenting for follow-up.  She was last seen in September 2022. At that time she was having intermittent abdominal pain and had a CT scan performed.  She was found have a partially collapsed urinary bladder which was grossly unremarkable.  She also had a 4 x 3.7 x 3.4 cm left adnexal lesion which was likely compatible with dermoid cyst. She had a repeat CT scan performed on October 4, 2022, Which demonstrated fat stranding around the pancreatic head and parker hepatis concerning for acute pancreatitis. She had an abdominal ultrasound which demonstrated no biliary dilation in her common bile duct was normal at 0.5.  Her gallbladder was also normal.  She does not drink much alcohol. She did not have any before these events. She does still have abdominal pain which is typically across the middle of her abdomen. She did have nausea/emesis but none now. She denies any family hx of pancreatitis.   9/6/22 66-year-old female with a history of hypertension, hyperlipidemia, asthma, obesity, sleep apnea, migraines, upper GI bleed secondary to peptic ulcer disease, GERD, diverticulosis, presenting for evaluation of She was last seen in the office in March for follow-up regarding intermittent diarrhea, which have resolved with use of a daily fiber supplement and probiotic.  Her GERD symptoms were managed with pantoprazole. She returns today with recurrent symptoms. Since October of last year, she has experienced intermittent episodes of numerous vague symptoms which have occurred about every 2-3 months. She states she will start with what resembles cold symptoms, followed by a complete loss of appetite. She is unable to eat for several days. Once she feels she can tolerate oral intake, she can only eat very small portions due to nausea and early satiety. This lasts for 3-4 days, where she is only able to tolerate food such as rice, eggs, or chicken noodle soup. During this time, she will experience diffuse abdominal soreness and indigestion, which are alleviated with dicyclomine. She will have looser stools, which respond to an increase in Metamucil. The symptoms will ultimately subside, and she is asymptomatic until the next episode. She denies blood in the stool, nocturnal defecation, or vomiting during the episodes.   Her bowel habits are regular with Metamucil, and she denies abdominal pain between episodes mentioned above. Her reflux symptoms are typically well managed with twice daily pantoprazole.She has been having issues with pills sticking. She admits she will take more than 5 pills at once. Scheduled pt for Injectafer dose 2/2 and labs for Tuesday 1/5. Pt was appreciative of call. No other questions or concerns stated at this time.

## 2023-04-18 ENCOUNTER — WEB ENCOUNTER (OUTPATIENT)
Dept: URBAN - METROPOLITAN AREA CLINIC 113 | Facility: CLINIC | Age: 68
End: 2023-04-18

## 2023-04-24 ENCOUNTER — OFFICE VISIT (OUTPATIENT)
Dept: URBAN - METROPOLITAN AREA CLINIC 113 | Facility: CLINIC | Age: 68
End: 2023-04-24
Payer: COMMERCIAL

## 2023-04-24 VITALS
WEIGHT: 241 LBS | HEIGHT: 66 IN | HEART RATE: 59 BPM | BODY MASS INDEX: 38.73 KG/M2 | RESPIRATION RATE: 16 BRPM | DIASTOLIC BLOOD PRESSURE: 62 MMHG | TEMPERATURE: 97.3 F | SYSTOLIC BLOOD PRESSURE: 116 MMHG

## 2023-04-24 DIAGNOSIS — K57.30 COLON, DIVERTICULOSIS: ICD-10-CM

## 2023-04-24 DIAGNOSIS — R13.19 ESOPHAGEAL DYSPHAGIA: ICD-10-CM

## 2023-04-24 DIAGNOSIS — R68.81 EARLY SATIETY: ICD-10-CM

## 2023-04-24 DIAGNOSIS — K58.0 IRRITABLE BOWEL SYNDROME WITH DIARRHEA: ICD-10-CM

## 2023-04-24 DIAGNOSIS — Z80.0 FAMILY HISTORY OF COLON CANCER: ICD-10-CM

## 2023-04-24 DIAGNOSIS — R19.4 CHANGE IN BOWEL HABITS: ICD-10-CM

## 2023-04-24 DIAGNOSIS — K76.0 FATTY LIVER: ICD-10-CM

## 2023-04-24 DIAGNOSIS — K21.9 GASTROESOPHAGEAL REFLUX DISEASE, UNSPECIFIED WHETHER ESOPHAGITIS PRESENT: ICD-10-CM

## 2023-04-24 DIAGNOSIS — R10.84 GENERALIZED ABDOMINAL PAIN: ICD-10-CM

## 2023-04-24 DIAGNOSIS — R74.8 ELEVATED ALKALINE PHOSPHATASE LEVEL: ICD-10-CM

## 2023-04-24 DIAGNOSIS — K85.90 ACUTE PANCREATITIS WITHOUT INFECTION OR NECROSIS, UNSPECIFIED PANCREATITIS TYPE: ICD-10-CM

## 2023-04-24 DIAGNOSIS — K82.8 BILIARY DYSKINESIA: ICD-10-CM

## 2023-04-24 PROBLEM — 197321007: Status: ACTIVE | Noted: 2023-04-24

## 2023-04-24 PROCEDURE — 99214 OFFICE O/P EST MOD 30 MIN: CPT

## 2023-04-24 RX ORDER — PREGABALIN 100 MG/1
1 CAPSULE CAPSULE ORAL TWICE DAILY
Status: ACTIVE | COMMUNITY

## 2023-04-24 RX ORDER — VITAMIN E 200 UNIT
AS DIRECTED CAPSULE ORAL
Status: ACTIVE | COMMUNITY

## 2023-04-24 RX ORDER — DICYCLOMINE HYDROCHLORIDE 10 MG/1
2 CAPSULES CAPSULE ORAL THREE TIMES A DAY
Status: ACTIVE | COMMUNITY

## 2023-04-24 RX ORDER — ZOLPIDEM TARTRATE 10 MG/1
1 TABLET AT BEDTIME AS NEEDED TABLET, FILM COATED ORAL ONCE A DAY
Status: ON HOLD | COMMUNITY

## 2023-04-24 RX ORDER — PANTOPRAZOLE SODIUM 40 MG/1
TAKE ONE TABLET TWICE A DAY TABLET, DELAYED RELEASE ORAL TWICE DAILY
Qty: 180 | Refills: 3 | Status: ACTIVE | COMMUNITY
Start: 2018-03-07

## 2023-04-24 RX ORDER — POTASSIUM CHLORIDE 750 MG/1
1 CAPSULE WITH FOOD CAPSULE, EXTENDED RELEASE ORAL TWICE A DAY
Status: ACTIVE | COMMUNITY

## 2023-04-24 RX ORDER — CYCLOSPORINE 0.5 MG/ML
1 DROP INTO AFFECTED EYE EMULSION OPHTHALMIC TWICE A DAY
Status: ACTIVE | COMMUNITY

## 2023-04-24 RX ORDER — LOSARTAN POTASSIUM AND HYDROCHLOROTHIAZIDE 100; 25 MG/1; MG/1
TAKE 1 TABLET DAILY TABLET, FILM COATED ORAL
Refills: 0 | Status: ON HOLD | COMMUNITY
Start: 2018-03-07

## 2023-04-24 RX ORDER — GLUCOSAM/MSM/C/MAN/WILLOW/GING 500-333.3
TAKE AS DIRECTED TABLET ORAL
Refills: 0 | Status: ACTIVE | COMMUNITY

## 2023-04-24 RX ORDER — DICYCLOMINE HYDROCHLORIDE 10 MG/1
TAKE 2 CAPSULES BY MOUTH 3 TIMES A DAY CAPSULE ORAL
Qty: 540 CAPSULE | Refills: 2 | Status: ON HOLD | COMMUNITY

## 2023-04-24 RX ORDER — HYDROCHLOROTHIAZIDE 25 MG/1
1 TABLET IN THE MORNING TABLET ORAL ONCE A DAY
Status: ACTIVE | COMMUNITY

## 2023-04-24 RX ORDER — LEVOCETIRIZINE DIHYDROCHLORIDE 5 MG/1
1 TABLET IN THE EVENING TABLET ORAL ONCE A DAY
Status: ACTIVE | COMMUNITY

## 2023-04-24 RX ORDER — FLUOXETINE HYDROCHLORIDE 40 MG/1
TAKE 1 CAPSULE DAILY CAPSULE ORAL
Refills: 0 | Status: ACTIVE | COMMUNITY
Start: 2018-04-18

## 2023-04-24 RX ORDER — SENNOSIDES 8.6 MG
TAKE 1 CAPSULE DAILY WITH A MEAL TABLET ORAL
Refills: 0 | Status: ACTIVE | COMMUNITY

## 2023-04-24 RX ORDER — PSYLLIUM HUSK 0.4 G
USE AS DIRECTED CAPSULE ORAL
Refills: 0 | Status: ACTIVE | COMMUNITY

## 2023-04-24 RX ORDER — TOPIRAMATE 50 MG/1
TAKE 1 TABLET TWICE DAILY TABLET, FILM COATED ORAL
Refills: 0 | Status: ON HOLD | COMMUNITY
Start: 2018-03-07

## 2023-04-24 RX ORDER — PRAVASTATIN SODIUM 40 MG/1
TAKE 1 TABLET DAILY AS DIRECTED TABLET ORAL
Refills: 0 | Status: ON HOLD | COMMUNITY
Start: 2018-03-07

## 2023-04-24 RX ORDER — RIZATRIPTAN BENZOATE 10 MG/1
TAKE 1 TABLET AT ONSET OF HEADACHE. MAY REPEAT EVERY 2 HOURS AS NEEDED. MAXIMUM 3 TABLETS IN 24 HOURS TABLET ORAL
Refills: 0 | Status: ACTIVE | COMMUNITY
Start: 2018-09-26

## 2023-04-24 RX ORDER — AMLODIPINE BESYLATE 10 MG/1
1 TABLET TABLET ORAL ONCE A DAY
Status: ACTIVE | COMMUNITY

## 2023-04-24 RX ORDER — MINOCYCLINE HYDROCHLORIDE 100 MG/1
1 TABLET TABLET, FILM COATED ORAL
Refills: 0 | Status: ACTIVE | COMMUNITY
Start: 2019-01-02

## 2023-04-24 RX ORDER — ERENUMAB-AOOE 140 MG/ML
AS DIRECTED INJECTION, SOLUTION SUBCUTANEOUS
Status: ACTIVE | COMMUNITY

## 2023-04-24 NOTE — HPI-TODAY'S VISIT:
67-year-old female presents for follow-up.  She was last seen on 2/22/2023.  She was planned for cholecystectomy on 3/20.  She did decline a trial of bile acid binders for her diarrhea predominant irritable bowel syndrome.  We would reconsider following cholecystectomy.  We did review swallowing precautions pertaining to pills.  If this persisted we would consider a barium swallow.  Labs 4/19/2023:Low amylase 27, unremarkable hepatic function panel, normal lipase.   She does have a mild constipation issue even despite the surgery. She is having 1-3 bowel movements per day. Her constipation is improving. She had a pleasant experience with Dr. Zapien. She was told her pancreas appeared inflamed during surgery. She was also told she had a fatty liver. She has multiple questions pertaining to treatment of fatty liver and preferred diet. She has lost almost 30 pounds since September. SHe has not had ETOH in 9 months.  2/22/2023 67-year-old female presents for follow-up.  She was last seen on 12/12/2022.  She had a recent episode of acute pancreatitis.  Repeat labs revealed normal LFTs, lipase, CBC and IgG4.  MRCP revealed resolving inflammation of the pancreas though did reveal gallbladder wall thickening.  Follow-up HIDA scan revealed marked biliary dyskinesia.  Question whether this was cause for pancreatitis.  She was referred to surgery.  She did also report a change in bowel habits from diarrhea predominant constipation predominant ongoing since October.  She was recommended to restart fiber.  If no improvement we considered MiraLAX with titration to effect.  Of note colonoscopy in 2020 was normal. It is unclear whether she was seen by Dr. Schwartz.   Labs 1/5/2023: negative urinalysis, normal CBC, Hgb A1c 5.3, normal CMP, normal thyroid studies, cholesterol 284, triglycerides 221, DLDL 197.   It appears patient was again referred to general surgery by PCP in January. She is scheduled for surgery on 3/20 with Dr. Darlene Zapien. She denies nausea or vomiting. She denies any episodes of severe abdominal pain similar to her episode of pancreatitis. She is losing weight slowly. Her stools have returned to diarrhea predominant. THis is her baseline. She does have urgency and occasional incontinence with passing gas. She takes 3 fiber capsules daily. She is also taking a daily probiotic. She uses Imodium maybe 3 times per week for maintenance. She does have dysphagia to pills. She admits to taking over 5 pills at one time. Some of the pills will become stuck at the top of her throat then pass with a second sip of water. She had a CT calcium score. She has since started a statin medication.   12/12/22 67-year-old female presents for follow-up.  She was last seen on 11/30/2022.  Regarding her recent episode of acute pancreatitis, she was planned for additional labs to include IgG subclass 4, repeat lipase and CMP.  She was also planned for MRCP.  Differential included medication induced pancreatitis from thiazide diuretics or previous losartan.  She did report esophageal dysphagia.  We would consider barium swallow or EGD if this persisted.  Regarding her persistent generalized abdominal pain, she was planned for HIDA scan.  Labs 11/30/2022: Normal lipase glucose 105, potassium 3.3, normal LFTs, normal IgG Subclass 4.  MRCP 12/9/2022: Interval resolution of inflammatory changes about the pancreatic head.  Nonspecific abnormal gallbladder wall thickening without evidence of cholelithiasis or choledocholithiasis.  Mild hepatosplenomegaly.  HIDA scan 12/9/2022: Low gallbladder ejection fraction compatible with dyskinesia/dysmotility.  Gallbladder ejection fraction of 13%.  She has been asymptomatic since her last visit. She feels her symptoms have been brought on by Covid. She has had similar symptoms after receiving the Covid vaccines/boosters. She had recently gotten her fifth booster shot. She does not feel her symptoms were related to food. She is watching what she is eating. She is able to tolerate most foods again. She has had symptoms following consumption of dairy. She has had ETOH a few times over the last week without any issues. She started taking Aimovig in October however problems were ongoing prior to this. She has remained on HCTZ for years. She is planning to start a Mediterranean diet for weight loss.   11/30/22 66-year-old female presenting for follow-up.  She was last seen in September 2022. At that time she was having intermittent abdominal pain and had a CT scan performed.  She was found have a partially collapsed urinary bladder which was grossly unremarkable.  She also had a 4 x 3.7 x 3.4 cm left adnexal lesion which was likely compatible with dermoid cyst. She had a repeat CT scan performed on October 4, 2022, Which demonstrated fat stranding around the pancreatic head and parker hepatis concerning for acute pancreatitis. She had an abdominal ultrasound which demonstrated no biliary dilation in her common bile duct was normal at 0.5.  Her gallbladder was also normal.  She does not drink much alcohol. She did not have any before these events. She does still have abdominal pain which is typically across the middle of her abdomen. She did have nausea/emesis but none now. She denies any family hx of pancreatitis.   9/6/22 66-year-old female with a history of hypertension, hyperlipidemia, asthma, obesity, sleep apnea, migraines, upper GI bleed secondary to peptic ulcer disease, GERD, diverticulosis, presenting for evaluation of She was last seen in the office in March for follow-up regarding intermittent diarrhea, which have resolved with use of a daily fiber supplement and probiotic.  Her GERD symptoms were managed with pantoprazole. She returns today with recurrent symptoms. Since October of last year, she has experienced intermittent episodes of numerous vague symptoms which have occurred about every 2-3 months. She states she will start with what resembles cold symptoms, followed by a complete loss of appetite. She is unable to eat for several days. Once she feels she can tolerate oral intake, she can only eat very small portions due to nausea and early satiety. This lasts for 3-4 days, where she is only able to tolerate food such as rice, eggs, or chicken noodle soup. During this time, she will experience diffuse abdominal soreness and indigestion, which are alleviated with dicyclomine. She will have looser stools, which respond to an increase in Metamucil. The symptoms will ultimately subside, and she is asymptomatic until the next episode. She denies blood in the stool, nocturnal defecation, or vomiting during the episodes.   Her bowel habits are regular with Metamucil, and she denies abdominal pain between episodes mentioned above. Her reflux symptoms are typically well managed with twice daily pantoprazole.She has been having issues with pills sticking. She admits she will take more than 5 pills at once.

## 2023-05-04 ENCOUNTER — WEB ENCOUNTER (OUTPATIENT)
Dept: URBAN - METROPOLITAN AREA CLINIC 113 | Facility: CLINIC | Age: 68
End: 2023-05-04

## 2023-05-04 RX ORDER — PANTOPRAZOLE SODIUM 40 MG/1
1 TABLET TABLET, DELAYED RELEASE ORAL ONCE A DAY
Qty: 90 TABLET | Refills: 3
Start: 2018-03-07

## 2023-07-24 ENCOUNTER — OFFICE VISIT (OUTPATIENT)
Dept: URBAN - METROPOLITAN AREA CLINIC 113 | Facility: CLINIC | Age: 68
End: 2023-07-24
Payer: COMMERCIAL

## 2023-07-24 VITALS
HEIGHT: 66 IN | SYSTOLIC BLOOD PRESSURE: 125 MMHG | RESPIRATION RATE: 18 BRPM | BODY MASS INDEX: 38.15 KG/M2 | TEMPERATURE: 97.1 F | WEIGHT: 237.4 LBS | DIASTOLIC BLOOD PRESSURE: 60 MMHG | HEART RATE: 64 BPM

## 2023-07-24 DIAGNOSIS — R19.4 CHANGE IN BOWEL HABITS: ICD-10-CM

## 2023-07-24 DIAGNOSIS — Z80.0 FAMILY HISTORY OF COLON CANCER: ICD-10-CM

## 2023-07-24 DIAGNOSIS — R68.81 EARLY SATIETY: ICD-10-CM

## 2023-07-24 DIAGNOSIS — K82.8 BILIARY DYSKINESIA: ICD-10-CM

## 2023-07-24 DIAGNOSIS — R10.84 GENERALIZED ABDOMINAL PAIN: ICD-10-CM

## 2023-07-24 DIAGNOSIS — R74.8 ELEVATED ALKALINE PHOSPHATASE LEVEL: ICD-10-CM

## 2023-07-24 DIAGNOSIS — K76.0 FATTY LIVER: ICD-10-CM

## 2023-07-24 DIAGNOSIS — K85.90 ACUTE PANCREATITIS WITHOUT INFECTION OR NECROSIS, UNSPECIFIED PANCREATITIS TYPE: ICD-10-CM

## 2023-07-24 DIAGNOSIS — R13.19 ESOPHAGEAL DYSPHAGIA: ICD-10-CM

## 2023-07-24 DIAGNOSIS — K58.0 IRRITABLE BOWEL SYNDROME WITH DIARRHEA: ICD-10-CM

## 2023-07-24 DIAGNOSIS — K21.9 GASTROESOPHAGEAL REFLUX DISEASE, UNSPECIFIED WHETHER ESOPHAGITIS PRESENT: ICD-10-CM

## 2023-07-24 DIAGNOSIS — K57.30 COLON, DIVERTICULOSIS: ICD-10-CM

## 2023-07-24 PROCEDURE — 99214 OFFICE O/P EST MOD 30 MIN: CPT

## 2023-07-24 RX ORDER — GLUCOSAM/MSM/C/MAN/WILLOW/GING 500-333.3
TAKE AS DIRECTED TABLET ORAL
Refills: 0 | Status: ACTIVE | COMMUNITY

## 2023-07-24 RX ORDER — TOPIRAMATE 50 MG/1
TAKE 1 TABLET TWICE DAILY TABLET, FILM COATED ORAL
Refills: 0 | Status: ON HOLD | COMMUNITY
Start: 2018-03-07

## 2023-07-24 RX ORDER — ERENUMAB-AOOE 140 MG/ML
AS DIRECTED INJECTION, SOLUTION SUBCUTANEOUS
Status: ACTIVE | COMMUNITY

## 2023-07-24 RX ORDER — LEVOCETIRIZINE DIHYDROCHLORIDE 5 MG/1
1 TABLET IN THE EVENING TABLET ORAL ONCE A DAY
Status: ACTIVE | COMMUNITY

## 2023-07-24 RX ORDER — POTASSIUM CHLORIDE 750 MG/1
1 CAPSULE WITH FOOD CAPSULE, EXTENDED RELEASE ORAL TWICE A DAY
Status: ACTIVE | COMMUNITY

## 2023-07-24 RX ORDER — PSYLLIUM HUSK 0.4 G
USE AS DIRECTED CAPSULE ORAL
Refills: 0 | Status: ACTIVE | COMMUNITY

## 2023-07-24 RX ORDER — ZOLPIDEM TARTRATE 10 MG/1
1 TABLET AT BEDTIME AS NEEDED TABLET, FILM COATED ORAL ONCE A DAY
Status: ON HOLD | COMMUNITY

## 2023-07-24 RX ORDER — DICYCLOMINE HYDROCHLORIDE 10 MG/1
1 CAPSULE 30 MINUTES BEFORE EATING CAPSULE ORAL
Qty: 270 | Refills: 1

## 2023-07-24 RX ORDER — LOSARTAN POTASSIUM AND HYDROCHLOROTHIAZIDE 100; 25 MG/1; MG/1
TAKE 1 TABLET DAILY TABLET, FILM COATED ORAL
Refills: 0 | Status: ON HOLD | COMMUNITY
Start: 2018-03-07

## 2023-07-24 RX ORDER — AMLODIPINE BESYLATE 10 MG/1
1 TABLET TABLET ORAL ONCE A DAY
Status: ACTIVE | COMMUNITY

## 2023-07-24 RX ORDER — CYCLOSPORINE 0.5 MG/ML
1 DROP INTO AFFECTED EYE EMULSION OPHTHALMIC TWICE A DAY
Status: ACTIVE | COMMUNITY

## 2023-07-24 RX ORDER — PRAVASTATIN SODIUM 40 MG/1
TAKE 1 TABLET DAILY AS DIRECTED TABLET ORAL
Refills: 0 | Status: ON HOLD | COMMUNITY
Start: 2018-03-07

## 2023-07-24 RX ORDER — PANTOPRAZOLE SODIUM 40 MG/1
1 TABLET TABLET, DELAYED RELEASE ORAL ONCE A DAY
Qty: 90 TABLET | Refills: 3 | Status: ACTIVE | COMMUNITY
Start: 2018-03-07

## 2023-07-24 RX ORDER — DICYCLOMINE HYDROCHLORIDE 10 MG/1
2 CAPSULES CAPSULE ORAL THREE TIMES A DAY
Status: ACTIVE | COMMUNITY

## 2023-07-24 RX ORDER — DICYCLOMINE HYDROCHLORIDE 10 MG/1
TAKE 2 CAPSULES BY MOUTH 3 TIMES A DAY CAPSULE ORAL
Qty: 540 CAPSULE | Refills: 2 | Status: ON HOLD | COMMUNITY

## 2023-07-24 RX ORDER — PREGABALIN 100 MG/1
1 CAPSULE CAPSULE ORAL TWICE DAILY
Status: ACTIVE | COMMUNITY

## 2023-07-24 RX ORDER — RIZATRIPTAN BENZOATE 10 MG/1
TAKE 1 TABLET AT ONSET OF HEADACHE. MAY REPEAT EVERY 2 HOURS AS NEEDED. MAXIMUM 3 TABLETS IN 24 HOURS TABLET ORAL
Refills: 0 | Status: ACTIVE | COMMUNITY
Start: 2018-09-26

## 2023-07-24 RX ORDER — VITAMIN E 200 UNIT
AS DIRECTED CAPSULE ORAL
Status: ACTIVE | COMMUNITY

## 2023-07-24 RX ORDER — MINOCYCLINE HYDROCHLORIDE 100 MG/1
1 TABLET TABLET, FILM COATED ORAL
Refills: 0 | Status: ACTIVE | COMMUNITY
Start: 2019-01-02

## 2023-07-24 RX ORDER — HYDROCHLOROTHIAZIDE 25 MG/1
1 TABLET IN THE MORNING TABLET ORAL ONCE A DAY
Status: ACTIVE | COMMUNITY

## 2023-07-24 RX ORDER — FLUOXETINE HYDROCHLORIDE 40 MG/1
TAKE 1 CAPSULE DAILY CAPSULE ORAL
Refills: 0 | Status: ACTIVE | COMMUNITY
Start: 2018-04-18

## 2023-07-24 RX ORDER — SENNOSIDES 8.6 MG
TAKE 1 CAPSULE DAILY WITH A MEAL TABLET ORAL
Refills: 0 | Status: ACTIVE | COMMUNITY

## 2023-07-24 NOTE — HPI-TODAY'S VISIT:
67-year-old female presents for follow-up.  She was last seen on 4/24/2023.  She is planned for CT scan of the abdomen/pelvis to assess for pancreatic inflammation following cholecystectomy.  Regarding her new diagnosis of fatty liver disease found during cholecystectomy, she had lost almost 30 pounds just since September 2022.  She was to repeat LFTs prior to follow-up to calculate fib score. We do not have CT scan for review.  We do not have updated labs.   She does not recall getting a call from [facility] to schedule the CT scan. She denies abdominal pain, nausea or vomiting. She is feeling much better since having hr gallbladder removed. Bowel movements are well controlled on daily probitoic and fiber capsules. In fact, she has felt more constipated of late. She has been traveling more over the last two months and recently went to [place] for a week. She has found it difficult to stick to her diet but she has only consumed ETOh on one occasion. She does find it hard to exercise given her left knee pain as well. She sees the orthopedist next week.  4/24/2023: 67-year-old female presents for follow-up.  She was last seen on 2/22/2023.  She was planned for cholecystectomy on 3/20.  She did decline a trial of bile acid binders for her diarrhea predominant irritable bowel syndrome.  We would reconsider following cholecystectomy.  We did review swallowing precautions pertaining to pills.  If this persisted we would consider a barium swallow.  Labs 4/19/2023:Low amylase 27, unremarkable hepatic function panel, normal lipase.   She does have a mild constipation issue even despite the surgery. She is having 1-3 bowel movements per day. Her constipation is improving. She had a pleasant experience with Dr. Zapien. She was told her pancreas appeared inflamed during surgery. She was also told she had a fatty liver. She has multiple questions pertaining to treatment of fatty liver and preferred diet. She has lost almost 30 pounds since September. SHe has not had ETOH in 9 months.  2/22/2023 67-year-old female presents for follow-up.  She was last seen on 12/12/2022.  She had a recent episode of acute pancreatitis.  Repeat labs revealed normal LFTs, lipase, CBC and IgG4.  MRCP revealed resolving inflammation of the pancreas though did reveal gallbladder wall thickening.  Follow-up HIDA scan revealed marked biliary dyskinesia.  Question whether this was cause for pancreatitis.  She was referred to surgery.  She did also report a change in bowel habits from diarrhea predominant constipation predominant ongoing since October.  She was recommended to restart fiber.  If no improvement we considered MiraLAX with titration to effect.  Of note colonoscopy in 2020 was normal. It is unclear whether she was seen by Dr. Schwartz.   Labs 1/5/2023: negative urinalysis, normal CBC, Hgb A1c 5.3, normal CMP, normal thyroid studies, cholesterol 284, triglycerides 221, DLDL 197.   It appears patient was again referred to general surgery by PCP in January. She is scheduled for surgery on 3/20 with Dr. Darlene Zapien. She denies nausea or vomiting. She denies any episodes of severe abdominal pain similar to her episode of pancreatitis. She is losing weight slowly. Her stools have returned to diarrhea predominant. THis is her baseline. She does have urgency and occasional incontinence with passing gas. She takes 3 fiber capsules daily. She is also taking a daily probiotic. She uses Imodium maybe 3 times per week for maintenance. She does have dysphagia to pills. She admits to taking over 5 pills at one time. Some of the pills will become stuck at the top of her throat then pass with a second sip of water. She had a CT calcium score. She has since started a statin medication.   12/12/22 67-year-old female presents for follow-up.  She was last seen on 11/30/2022.  Regarding her recent episode of acute pancreatitis, she was planned for additional labs to include IgG subclass 4, repeat lipase and CMP.  She was also planned for MRCP.  Differential included medication induced pancreatitis from thiazide diuretics or previous losartan.  She did report esophageal dysphagia.  We would consider barium swallow or EGD if this persisted.  Regarding her persistent generalized abdominal pain, she was planned for HIDA scan.  Labs 11/30/2022: Normal lipase glucose 105, potassium 3.3, normal LFTs, normal IgG Subclass 4.  MRCP 12/9/2022: Interval resolution of inflammatory changes about the pancreatic head.  Nonspecific abnormal gallbladder wall thickening without evidence of cholelithiasis or choledocholithiasis.  Mild hepatosplenomegaly.  HIDA scan 12/9/2022: Low gallbladder ejection fraction compatible with dyskinesia/dysmotility.  Gallbladder ejection fraction of 13%.  She has been asymptomatic since her last visit. She feels her symptoms have been brought on by Covid. She has had similar symptoms after receiving the Covid vaccines/boosters. She had recently gotten her fifth booster shot. She does not feel her symptoms were related to food. She is watching what she is eating. She is able to tolerate most foods again. She has had symptoms following consumption of dairy. She has had ETOH a few times over the last week without any issues. She started taking Aimovig in October however problems were ongoing prior to this. She has remained on HCTZ for years. She is planning to start a Mediterranean diet for weight loss.   11/30/22 66-year-old female presenting for follow-up.  She was last seen in September 2022. At that time she was having intermittent abdominal pain and had a CT scan performed.  She was found have a partially collapsed urinary bladder which was grossly unremarkable.  She also had a 4 x 3.7 x 3.4 cm left adnexal lesion which was likely compatible with dermoid cyst. She had a repeat CT scan performed on October 4, 2022, Which demonstrated fat stranding around the pancreatic head and parker hepatis concerning for acute pancreatitis. She had an abdominal ultrasound which demonstrated no biliary dilation in her common bile duct was normal at 0.5.  Her gallbladder was also normal.  She does not drink much alcohol. She did not have any before these events. She does still have abdominal pain which is typically across the middle of her abdomen. She did have nausea/emesis but none now. She denies any family hx of pancreatitis.   9/6/22 66-year-old female with a history of hypertension, hyperlipidemia, asthma, obesity, sleep apnea, migraines, upper GI bleed secondary to peptic ulcer disease, GERD, diverticulosis, presenting for evaluation of She was last seen in the office in March for follow-up regarding intermittent diarrhea, which have resolved with use of a daily fiber supplement and probiotic.  Her GERD symptoms were managed with pantoprazole. She returns today with recurrent symptoms. Since October of last year, she has experienced intermittent episodes of numerous vague symptoms which have occurred about every 2-3 months. She states she will start with what resembles cold symptoms, followed by a complete loss of appetite. She is unable to eat for several days. Once she feels she can tolerate oral intake, she can only eat very small portions due to nausea and early satiety. This lasts for 3-4 days, where she is only able to tolerate food such as rice, eggs, or chicken noodle soup. During this time, she will experience diffuse abdominal soreness and indigestion, which are alleviated with dicyclomine. She will have looser stools, which respond to an increase in Metamucil. The symptoms will ultimately subside, and she is asymptomatic until the next episode. She denies blood in the stool, nocturnal defecation, or vomiting during the episodes.   Her bowel habits are regular with Metamucil, and she denies abdominal pain between episodes mentioned above. Her reflux symptoms are typically well managed with twice daily pantoprazole.She has been having issues with pills sticking. She admits she will take more than 5 pills at once.

## 2024-01-04 ENCOUNTER — OFFICE VISIT (OUTPATIENT)
Dept: URBAN - METROPOLITAN AREA CLINIC 113 | Facility: CLINIC | Age: 69
End: 2024-01-04

## 2024-01-04 NOTE — HPI-TODAY'S VISIT:
68-year-old female presents for follow-up.  She was last seen on 7/20/2023.  Her CT scan of the abdomen/pelvis was reordered otherwise she was asymptomatic. CT scan of the abdomen/pelvis with contrast 7/27/2023:Liver is normal.  Gallbladder is contracted otherwise normal.  Pancreas is normal.  Interval resolution of changes of acute pancreatitis noted on the prior study. Patient was informed of results. Labs 9/28/2023:Normal CBC, glucose 119, normal LFTs. Patient was informed her PCP lab results were reviewed and normal. 7/20/2023: 67-year-old female presents for follow-up.  She was last seen on 4/24/2023.  She is planned for CT scan of the abdomen/pelvis to assess for pancreatic inflammation following cholecystectomy.  Regarding her new diagnosis of fatty liver disease found during cholecystectomy, she had lost almost 30 pounds just since September 2022.  She was to repeat LFTs prior to follow-up to calculate fib score. We do not have CT scan for review.  We do not have updated labs.   She does not recall getting a call from [facility] to schedule the CT scan. She denies abdominal pain, nausea or vomiting. She is feeling much better since having hr gallbladder removed. Bowel movements are well controlled on daily probitoic and fiber capsules. In fact, she has felt more constipated of late. She has been traveling more over the last two months and recently went to [place] for a week. She has found it difficult to stick to her diet but she has only consumed ETOh on one occasion. She does find it hard to exercise given her left knee pain as well. She sees the orthopedist next week.  4/24/2023: 67-year-old female presents for follow-up.  She was last seen on 2/22/2023.  She was planned for cholecystectomy on 3/20.  She did decline a trial of bile acid binders for her diarrhea predominant irritable bowel syndrome.  We would reconsider following cholecystectomy.  We did review swallowing precautions pertaining to pills.  If this persisted we would consider a barium swallow.  Labs 4/19/2023:Low amylase 27, unremarkable hepatic function panel, normal lipase.   She does have a mild constipation issue even despite the surgery. She is having 1-3 bowel movements per day. Her constipation is improving. She had a pleasant experience with Dr. Zapien. She was told her pancreas appeared inflamed during surgery. She was also told she had a fatty liver. She has multiple questions pertaining to treatment of fatty liver and preferred diet. She has lost almost 30 pounds since September. SHe has not had ETOH in 9 months.  2/22/2023 67-year-old female presents for follow-up.  She was last seen on 12/12/2022.  She had a recent episode of acute pancreatitis.  Repeat labs revealed normal LFTs, lipase, CBC and IgG4.  MRCP revealed resolving inflammation of the pancreas though did reveal gallbladder wall thickening.  Follow-up HIDA scan revealed marked biliary dyskinesia.  Question whether this was cause for pancreatitis.  She was referred to surgery.  She did also report a change in bowel habits from diarrhea predominant constipation predominant ongoing since October.  She was recommended to restart fiber.  If no improvement we considered MiraLAX with titration to effect.  Of note colonoscopy in 2020 was normal. It is unclear whether she was seen by Dr. Schwartz.   Labs 1/5/2023: negative urinalysis, normal CBC, Hgb A1c 5.3, normal CMP, normal thyroid studies, cholesterol 284, triglycerides 221, DLDL 197.   It appears patient was again referred to general surgery by PCP in January. She is scheduled for surgery on 3/20 with Dr. Darlene Zapien. She denies nausea or vomiting. She denies any episodes of severe abdominal pain similar to her episode of pancreatitis. She is losing weight slowly. Her stools have returned to diarrhea predominant. THis is her baseline. She does have urgency and occasional incontinence with passing gas. She takes 3 fiber capsules daily. She is also taking a daily probiotic. She uses Imodium maybe 3 times per week for maintenance. She does have dysphagia to pills. She admits to taking over 5 pills at one time. Some of the pills will become stuck at the top of her throat then pass with a second sip of water. She had a CT calcium score. She has since started a statin medication.   12/12/22 67-year-old female presents for follow-up.  She was last seen on 11/30/2022.  Regarding her recent episode of acute pancreatitis, she was planned for additional labs to include IgG subclass 4, repeat lipase and CMP.  She was also planned for MRCP.  Differential included medication induced pancreatitis from thiazide diuretics or previous losartan.  She did report esophageal dysphagia.  We would consider barium swallow or EGD if this persisted.  Regarding her persistent generalized abdominal pain, she was planned for HIDA scan.  Labs 11/30/2022: Normal lipase glucose 105, potassium 3.3, normal LFTs, normal IgG Subclass 4.  MRCP 12/9/2022: Interval resolution of inflammatory changes about the pancreatic head.  Nonspecific abnormal gallbladder wall thickening without evidence of cholelithiasis or choledocholithiasis.  Mild hepatosplenomegaly.  HIDA scan 12/9/2022: Low gallbladder ejection fraction compatible with dyskinesia/dysmotility.  Gallbladder ejection fraction of 13%.  She has been asymptomatic since her last visit. She feels her symptoms have been brought on by Covid. She has had similar symptoms after receiving the Covid vaccines/boosters. She had recently gotten her fifth booster shot. She does not feel her symptoms were related to food. She is watching what she is eating. She is able to tolerate most foods again. She has had symptoms following consumption of dairy. She has had ETOH a few times over the last week without any issues. She started taking Aimovig in October however problems were ongoing prior to this. She has remained on HCTZ for years. She is planning to start a Mediterranean diet for weight loss.   11/30/22 66-year-old female presenting for follow-up.  She was last seen in September 2022. At that time she was having intermittent abdominal pain and had a CT scan performed.  She was found have a partially collapsed urinary bladder which was grossly unremarkable.  She also had a 4 x 3.7 x 3.4 cm left adnexal lesion which was likely compatible with dermoid cyst. She had a repeat CT scan performed on October 4, 2022, Which demonstrated fat stranding around the pancreatic head and parker hepatis concerning for acute pancreatitis. She had an abdominal ultrasound which demonstrated no biliary dilation in her common bile duct was normal at 0.5.  Her gallbladder was also normal.  She does not drink much alcohol. She did not have any before these events. She does still have abdominal pain which is typically across the middle of her abdomen. She did have nausea/emesis but none now. She denies any family hx of pancreatitis.   9/6/22 66-year-old female with a history of hypertension, hyperlipidemia, asthma, obesity, sleep apnea, migraines, upper GI bleed secondary to peptic ulcer disease, GERD, diverticulosis, presenting for evaluation of She was last seen in the office in March for follow-up regarding intermittent diarrhea, which have resolved with use of a daily fiber supplement and probiotic.  Her GERD symptoms were managed with pantoprazole. She returns today with recurrent symptoms. Since October of last year, she has experienced intermittent episodes of numerous vague symptoms which have occurred about every 2-3 months. She states she will start with what resembles cold symptoms, followed by a complete loss of appetite. She is unable to eat for several days. Once she feels she can tolerate oral intake, she can only eat very small portions due to nausea and early satiety. This lasts for 3-4 days, where she is only able to tolerate food such as rice, eggs, or chicken noodle soup. During this time, she will experience diffuse abdominal soreness and indigestion, which are alleviated with dicyclomine. She will have looser stools, which respond to an increase in Metamucil. The symptoms will ultimately subside, and she is asymptomatic until the next episode. She denies blood in the stool, nocturnal defecation, or vomiting during the episodes.   Her bowel habits are regular with Metamucil, and she denies abdominal pain between episodes mentioned above. Her reflux symptoms are typically well managed with twice daily pantoprazole.She has been having issues with pills sticking. She admits she will take more than 5 pills at once.

## 2024-01-17 ENCOUNTER — ERX REFILL RESPONSE (OUTPATIENT)
Dept: URBAN - METROPOLITAN AREA CLINIC 113 | Facility: CLINIC | Age: 69
End: 2024-01-17

## 2024-01-17 RX ORDER — DICYCLOMINE HYDROCHLORIDE 10 MG/1
1 CAPSULE 30 MINUTES BEFORE EATING CAPSULE ORAL
Qty: 270 | Refills: 1 | OUTPATIENT

## 2024-01-24 ENCOUNTER — OFFICE VISIT (OUTPATIENT)
Dept: URBAN - METROPOLITAN AREA CLINIC 113 | Facility: CLINIC | Age: 69
End: 2024-01-24

## 2024-02-01 ENCOUNTER — OV EP (OUTPATIENT)
Dept: URBAN - METROPOLITAN AREA CLINIC 113 | Facility: CLINIC | Age: 69
End: 2024-02-01
Payer: COMMERCIAL

## 2024-02-01 VITALS
DIASTOLIC BLOOD PRESSURE: 70 MMHG | HEIGHT: 66 IN | HEART RATE: 70 BPM | WEIGHT: 254 LBS | SYSTOLIC BLOOD PRESSURE: 122 MMHG | RESPIRATION RATE: 16 BRPM | TEMPERATURE: 96.9 F | BODY MASS INDEX: 40.82 KG/M2

## 2024-02-01 DIAGNOSIS — K82.8 BILIARY DYSKINESIA: ICD-10-CM

## 2024-02-01 DIAGNOSIS — R74.8 ELEVATED ALKALINE PHOSPHATASE LEVEL: ICD-10-CM

## 2024-02-01 DIAGNOSIS — K58.0 IRRITABLE BOWEL SYNDROME WITH DIARRHEA: ICD-10-CM

## 2024-02-01 DIAGNOSIS — K76.0 FATTY LIVER: ICD-10-CM

## 2024-02-01 DIAGNOSIS — Z80.0 FAMILY HISTORY OF COLON CANCER: ICD-10-CM

## 2024-02-01 DIAGNOSIS — K21.9 GASTROESOPHAGEAL REFLUX DISEASE, UNSPECIFIED WHETHER ESOPHAGITIS PRESENT: ICD-10-CM

## 2024-02-01 DIAGNOSIS — R13.19 ESOPHAGEAL DYSPHAGIA: ICD-10-CM

## 2024-02-01 DIAGNOSIS — R49.0 VOICE HOARSENESS: ICD-10-CM

## 2024-02-01 DIAGNOSIS — R10.84 GENERALIZED ABDOMINAL PAIN: ICD-10-CM

## 2024-02-01 DIAGNOSIS — K85.90 ACUTE PANCREATITIS WITHOUT INFECTION OR NECROSIS, UNSPECIFIED PANCREATITIS TYPE: ICD-10-CM

## 2024-02-01 PROCEDURE — 99214 OFFICE O/P EST MOD 30 MIN: CPT

## 2024-02-01 RX ORDER — ERENUMAB-AOOE 140 MG/ML
AS DIRECTED INJECTION, SOLUTION SUBCUTANEOUS
Status: ACTIVE | COMMUNITY

## 2024-02-01 RX ORDER — LEVOCETIRIZINE DIHYDROCHLORIDE 5 MG/1
1 TABLET IN THE EVENING TABLET ORAL ONCE A DAY
Status: ACTIVE | COMMUNITY

## 2024-02-01 RX ORDER — ZOLPIDEM TARTRATE 10 MG/1
1 TABLET AT BEDTIME AS NEEDED TABLET, FILM COATED ORAL ONCE A DAY
Status: ON HOLD | COMMUNITY

## 2024-02-01 RX ORDER — TOPIRAMATE 50 MG/1
TAKE 1 TABLET TWICE DAILY TABLET, FILM COATED ORAL
Refills: 0 | Status: ON HOLD | COMMUNITY
Start: 2018-03-07

## 2024-02-01 RX ORDER — HYDROCHLOROTHIAZIDE 25 MG/1
1 TABLET IN THE MORNING TABLET ORAL ONCE A DAY
Status: ACTIVE | COMMUNITY

## 2024-02-01 RX ORDER — PSYLLIUM HUSK 0.4 G
USE AS DIRECTED CAPSULE ORAL
Refills: 0 | Status: ACTIVE | COMMUNITY

## 2024-02-01 RX ORDER — FLUTICASONE PROPIONATE 50 UG/1
1 SPRAY IN EACH NOSTRIL SPRAY, METERED NASAL ONCE A DAY
Status: ACTIVE | COMMUNITY

## 2024-02-01 RX ORDER — PREGABALIN 100 MG/1
1 CAPSULE CAPSULE ORAL TWICE DAILY
Status: ACTIVE | COMMUNITY

## 2024-02-01 RX ORDER — PANTOPRAZOLE SODIUM 40 MG/1
1 TABLET TABLET, DELAYED RELEASE ORAL ONCE A DAY
Qty: 90 TABLET | Refills: 3 | Status: ACTIVE | COMMUNITY
Start: 2018-03-07

## 2024-02-01 RX ORDER — DICYCLOMINE HYDROCHLORIDE 10 MG/1
1 CAPSULE 30 MINUTES BEFORE EATING CAPSULE ORAL
Qty: 270 | Refills: 1 | Status: ACTIVE | COMMUNITY

## 2024-02-01 RX ORDER — DICYCLOMINE HYDROCHLORIDE 10 MG/1
1 CAPSULE 30 MINUTES BEFORE EATING CAPSULE ORAL
Qty: 270 | Refills: 1 | OUTPATIENT

## 2024-02-01 RX ORDER — AMLODIPINE BESYLATE 10 MG/1
1 TABLET TABLET ORAL ONCE A DAY
Status: ACTIVE | COMMUNITY

## 2024-02-01 RX ORDER — FLUOXETINE HYDROCHLORIDE 40 MG/1
TAKE 1 CAPSULE DAILY CAPSULE ORAL
Refills: 0 | Status: ON HOLD | COMMUNITY
Start: 2018-04-18

## 2024-02-01 RX ORDER — LOSARTAN POTASSIUM AND HYDROCHLOROTHIAZIDE 100; 25 MG/1; MG/1
TAKE 1 TABLET DAILY TABLET, FILM COATED ORAL
Refills: 0 | Status: ON HOLD | COMMUNITY
Start: 2018-03-07

## 2024-02-01 RX ORDER — RIZATRIPTAN BENZOATE 10 MG/1
TAKE 1 TABLET AT ONSET OF HEADACHE. MAY REPEAT EVERY 2 HOURS AS NEEDED. MAXIMUM 3 TABLETS IN 24 HOURS TABLET ORAL
Refills: 0 | Status: ACTIVE | COMMUNITY
Start: 2018-09-26

## 2024-02-01 RX ORDER — GLUCOSAM/MSM/C/MAN/WILLOW/GING 500-333.3
TAKE AS DIRECTED TABLET ORAL
Refills: 0 | Status: ACTIVE | COMMUNITY

## 2024-02-01 RX ORDER — PRAVASTATIN SODIUM 40 MG/1
TAKE 1 TABLET DAILY AS DIRECTED TABLET ORAL
Refills: 0 | Status: ON HOLD | COMMUNITY
Start: 2018-03-07

## 2024-02-01 RX ORDER — POTASSIUM CHLORIDE 750 MG/1
1 CAPSULE WITH FOOD CAPSULE, EXTENDED RELEASE ORAL TWICE A DAY
Status: ACTIVE | COMMUNITY

## 2024-02-01 RX ORDER — MINOCYCLINE HYDROCHLORIDE 100 MG/1
1 TABLET TABLET, FILM COATED ORAL
Refills: 0 | Status: ACTIVE | COMMUNITY
Start: 2019-01-02

## 2024-02-01 RX ORDER — VITAMIN E 200 UNIT
AS DIRECTED CAPSULE ORAL
Status: ACTIVE | COMMUNITY

## 2024-02-01 RX ORDER — CYCLOSPORINE 0.5 MG/ML
1 DROP INTO AFFECTED EYE EMULSION OPHTHALMIC TWICE A DAY
Status: ACTIVE | COMMUNITY

## 2024-02-01 RX ORDER — SENNOSIDES 8.6 MG
TAKE 1 CAPSULE DAILY WITH A MEAL TABLET ORAL
Refills: 0 | Status: ACTIVE | COMMUNITY

## 2024-02-01 NOTE — HPI-TODAY'S VISIT:
68-year-old female presents for follow-up.  She was last seen on 7/20/2023.  Her CT scan of the abdomen/pelvis was reordered otherwise she was asymptomatic.  CT scan of the abdomen/pelvis with contrast 7/27/2023: Liver is normal.  Gallbladder is contracted otherwise normal.  Pancreas is normal.  Interval resolution of changes of acute pancreatitis noted on the prior study.  Patient was informed of results.  Labs 9/28/2023:Normal CBC, glucose 119, normal LFTs. Labs 1/26/2024: normal CBC, HbA1c 5.4, normal CMP, triglycerides 184, HDL 74, normal ESR, normal CRP.  Patient was informed her PCP lab results were reviewed and normal.  Her main complaint today is voice hoarseness ongoing since October. She had a laryngoscopy with ENT who stated reflux was causing this. She denies symptoms of heartburn or indigestion, but she does take Protonix 40 mg BID for GERD. She feels her voice has actually changed over the last several years. She enjoys singing and has been unable to reach the same high octaves for some time now. Her bowels alternate between diarrhea and constipation. She has two days of constipation followed by two days of diarrhea.   7/20/2023:  67-year-old female presents for follow-up.  She was last seen on 4/24/2023.  She is planned for CT scan of the abdomen/pelvis to assess for pancreatic inflammation following cholecystectomy.  Regarding her new diagnosis of fatty liver disease found during cholecystectomy, she had lost almost 30 pounds just since September 2022.  She was to repeat LFTs prior to follow-up to calculate fib score. We do not have CT scan for review.  We do not have updated labs.   She does not recall getting a call from [facility] to schedule the CT scan. She denies abdominal pain, nausea or vomiting. She is feeling much better since having hr gallbladder removed. Bowel movements are well controlled on daily probiotic and fiber capsules. In fact, she has felt more constipated of late. She has been traveling more over the last two months and recently went to [place] for a week. She has found it difficult to stick to her diet, but she has only consumed ETOH on one occasion. She does find it hard to exercise given her left knee pain as well. She sees the orthopedist next week.  4/24/2023: 67-year-old female presents for follow-up.  She was last seen on 2/22/2023.  She was planned for cholecystectomy on 3/20.  She did decline a trial of bile acid binders for her diarrhea predominant irritable bowel syndrome.  We would reconsider following cholecystectomy.  We did review swallowing precautions pertaining to pills.  If this persisted we would consider a barium swallow.  Labs 4/19/2023:Low amylase 27, unremarkable hepatic function panel, normal lipase.   She does have a mild constipation issue even despite the surgery. She is having 1-3 bowel movements per day. Her constipation is improving. She had a pleasant experience with Dr. Zapien. She was told her pancreas appeared inflamed during surgery. She was also told she had a fatty liver. She has multiple questions pertaining to treatment of fatty liver and preferred diet. She has lost almost 30 pounds since September. SHe has not had ETOH in 9 months.  2/22/2023 67-year-old female presents for follow-up.  She was last seen on 12/12/2022.  She had a recent episode of acute pancreatitis.  Repeat labs revealed normal LFTs, lipase, CBC and IgG4.  MRCP revealed resolving inflammation of the pancreas though did reveal gallbladder wall thickening.  Follow-up HIDA scan revealed marked biliary dyskinesia.  Question whether this was cause for pancreatitis.  She was referred to surgery.  She did also report a change in bowel habits from diarrhea predominant constipation predominant ongoing since October.  She was recommended to restart fiber.  If no improvement we considered MiraLAX with titration to effect.  Of note colonoscopy in 2020 was normal. It is unclear whether she was seen by Dr. Schwartz.   Labs 1/5/2023: negative urinalysis, normal CBC, Hgb A1c 5.3, normal CMP, normal thyroid studies, cholesterol 284, triglycerides 221, DLDL 197.   It appears patient was again referred to general surgery by PCP in January. She is scheduled for surgery on 3/20 with Dr. Darlene Zapien. She denies nausea or vomiting. She denies any episodes of severe abdominal pain similar to her episode of pancreatitis. She is losing weight slowly. Her stools have returned to diarrhea predominant. THis is her baseline. She does have urgency and occasional incontinence with passing gas. She takes 3 fiber capsules daily. She is also taking a daily probiotic. She uses Imodium maybe 3 times per week for maintenance. She does have dysphagia to pills. She admits to taking over 5 pills at one time. Some of the pills will become stuck at the top of her throat then pass with a second sip of water. She had a CT calcium score. She has since started a statin medication.   12/12/22 67-year-old female presents for follow-up.  She was last seen on 11/30/2022.  Regarding her recent episode of acute pancreatitis, she was planned for additional labs to include IgG subclass 4, repeat lipase and CMP.  She was also planned for MRCP.  Differential included medication induced pancreatitis from thiazide diuretics or previous losartan.  She did report esophageal dysphagia.  We would consider barium swallow or EGD if this persisted.  Regarding her persistent generalized abdominal pain, she was planned for HIDA scan.  Labs 11/30/2022: Normal lipase glucose 105, potassium 3.3, normal LFTs, normal IgG Subclass 4.  MRCP 12/9/2022: Interval resolution of inflammatory changes about the pancreatic head.  Nonspecific abnormal gallbladder wall thickening without evidence of cholelithiasis or choledocholithiasis.  Mild hepatosplenomegaly.  HIDA scan 12/9/2022: Low gallbladder ejection fraction compatible with dyskinesia/dysmotility.  Gallbladder ejection fraction of 13%.  She has been asymptomatic since her last visit. She feels her symptoms have been brought on by Covid. She has had similar symptoms after receiving the Covid vaccines/boosters. She had recently gotten her fifth booster shot. She does not feel her symptoms were related to food. She is watching what she is eating. She is able to tolerate most foods again. She has had symptoms following consumption of dairy. She has had ETOH a few times over the last week without any issues. She started taking Aimovig in October however problems were ongoing prior to this. She has remained on HCTZ for years. She is planning to start a Mediterranean diet for weight loss.   11/30/22 66-year-old female presenting for follow-up.  She was last seen in September 2022. At that time she was having intermittent abdominal pain and had a CT scan performed.  She was found have a partially collapsed urinary bladder which was grossly unremarkable.  She also had a 4 x 3.7 x 3.4 cm left adnexal lesion which was likely compatible with dermoid cyst. She had a repeat CT scan performed on October 4, 2022, Which demonstrated fat stranding around the pancreatic head and parker hepatis concerning for acute pancreatitis. She had an abdominal ultrasound which demonstrated no biliary dilation in her common bile duct was normal at 0.5.  Her gallbladder was also normal.  She does not drink much alcohol. She did not have any before these events. She does still have abdominal pain which is typically across the middle of her abdomen. She did have nausea/emesis but none now. She denies any family hx of pancreatitis.   9/6/22 66-year-old female with a history of hypertension, hyperlipidemia, asthma, obesity, sleep apnea, migraines, upper GI bleed secondary to peptic ulcer disease, GERD, diverticulosis, presenting for evaluation of She was last seen in the office in March for follow-up regarding intermittent diarrhea, which have resolved with use of a daily fiber supplement and probiotic.  Her GERD symptoms were managed with pantoprazole. She returns today with recurrent symptoms. Since October of last year, she has experienced intermittent episodes of numerous vague symptoms which have occurred about every 2-3 months. She states she will start with what resembles cold symptoms, followed by a complete loss of appetite. She is unable to eat for several days. Once she feels she can tolerate oral intake, she can only eat very small portions due to nausea and early satiety. This lasts for 3-4 days, where she is only able to tolerate food such as rice, eggs, or chicken noodle soup. During this time, she will experience diffuse abdominal soreness and indigestion, which are alleviated with dicyclomine. She will have looser stools, which respond to an increase in Metamucil. The symptoms will ultimately subside, and she is asymptomatic until the next episode. She denies blood in the stool, nocturnal defecation, or vomiting during the episodes.   Her bowel habits are regular with Metamucil, and she denies abdominal pain between episodes mentioned above. Her reflux symptoms are typically well managed with twice daily pantoprazole.She has been having issues with pills sticking. She admits she will take more than 5 pills at once.

## 2024-04-05 ENCOUNTER — EGD W/ BRV (OUTPATIENT)
Dept: URBAN - METROPOLITAN AREA MEDICAL CENTER 2 | Facility: MEDICAL CENTER | Age: 69
End: 2024-04-05

## 2024-04-05 DIAGNOSIS — K29.60 ADENOPAPILLOMATOSIS GASTRICA: ICD-10-CM

## 2024-04-05 RX ORDER — GLUCOSAM/MSM/C/MAN/WILLOW/GING 500-333.3
TAKE AS DIRECTED TABLET ORAL
Refills: 0 | Status: ACTIVE | COMMUNITY

## 2024-04-05 RX ORDER — VITAMIN E 200 UNIT
AS DIRECTED CAPSULE ORAL
Status: ACTIVE | COMMUNITY

## 2024-04-05 RX ORDER — HYDROCHLOROTHIAZIDE 25 MG/1
1 TABLET IN THE MORNING TABLET ORAL ONCE A DAY
Status: ACTIVE | COMMUNITY

## 2024-04-05 RX ORDER — PANTOPRAZOLE SODIUM 40 MG/1
1 TABLET TABLET, DELAYED RELEASE ORAL ONCE A DAY
Qty: 90 TABLET | Refills: 3 | Status: ACTIVE | COMMUNITY
Start: 2018-03-07

## 2024-04-05 RX ORDER — CYCLOSPORINE 0.5 MG/ML
1 DROP INTO AFFECTED EYE EMULSION OPHTHALMIC TWICE A DAY
Status: ACTIVE | COMMUNITY

## 2024-04-05 RX ORDER — PRAVASTATIN SODIUM 40 MG/1
TAKE 1 TABLET DAILY AS DIRECTED TABLET ORAL
Refills: 0 | Status: ON HOLD | COMMUNITY
Start: 2018-03-07

## 2024-04-05 RX ORDER — DICYCLOMINE HYDROCHLORIDE 10 MG/1
1 CAPSULE 30 MINUTES BEFORE EATING CAPSULE ORAL
Qty: 270 | Refills: 1 | Status: ACTIVE | COMMUNITY

## 2024-04-05 RX ORDER — LEVOCETIRIZINE DIHYDROCHLORIDE 5 MG/1
1 TABLET IN THE EVENING TABLET ORAL ONCE A DAY
Status: ACTIVE | COMMUNITY

## 2024-04-05 RX ORDER — POTASSIUM CHLORIDE 750 MG/1
1 CAPSULE WITH FOOD CAPSULE, EXTENDED RELEASE ORAL TWICE A DAY
Status: ACTIVE | COMMUNITY

## 2024-04-05 RX ORDER — PREGABALIN 100 MG/1
1 CAPSULE CAPSULE ORAL TWICE DAILY
Status: ACTIVE | COMMUNITY

## 2024-04-05 RX ORDER — AMLODIPINE BESYLATE 10 MG/1
1 TABLET TABLET ORAL ONCE A DAY
Status: ACTIVE | COMMUNITY

## 2024-04-05 RX ORDER — ERENUMAB-AOOE 140 MG/ML
AS DIRECTED INJECTION, SOLUTION SUBCUTANEOUS
Status: ACTIVE | COMMUNITY

## 2024-04-05 RX ORDER — RIZATRIPTAN BENZOATE 10 MG/1
TAKE 1 TABLET AT ONSET OF HEADACHE. MAY REPEAT EVERY 2 HOURS AS NEEDED. MAXIMUM 3 TABLETS IN 24 HOURS TABLET ORAL
Refills: 0 | Status: ACTIVE | COMMUNITY
Start: 2018-09-26

## 2024-04-05 RX ORDER — MINOCYCLINE HYDROCHLORIDE 100 MG/1
1 TABLET TABLET, FILM COATED ORAL
Refills: 0 | Status: ACTIVE | COMMUNITY
Start: 2019-01-02

## 2024-04-05 RX ORDER — FLUTICASONE PROPIONATE 50 UG/1
1 SPRAY IN EACH NOSTRIL SPRAY, METERED NASAL ONCE A DAY
Status: ACTIVE | COMMUNITY

## 2024-04-05 RX ORDER — FLUOXETINE HYDROCHLORIDE 40 MG/1
TAKE 1 CAPSULE DAILY CAPSULE ORAL
Refills: 0 | Status: ON HOLD | COMMUNITY
Start: 2018-04-18

## 2024-04-05 RX ORDER — PSYLLIUM HUSK 0.4 G
USE AS DIRECTED CAPSULE ORAL
Refills: 0 | Status: ACTIVE | COMMUNITY

## 2024-04-05 RX ORDER — SENNOSIDES 8.6 MG
TAKE 1 CAPSULE DAILY WITH A MEAL TABLET ORAL
Refills: 0 | Status: ACTIVE | COMMUNITY

## 2024-04-05 RX ORDER — ZOLPIDEM TARTRATE 10 MG/1
1 TABLET AT BEDTIME AS NEEDED TABLET, FILM COATED ORAL ONCE A DAY
Status: ON HOLD | COMMUNITY

## 2024-04-05 RX ORDER — TOPIRAMATE 50 MG/1
TAKE 1 TABLET TWICE DAILY TABLET, FILM COATED ORAL
Refills: 0 | Status: ON HOLD | COMMUNITY
Start: 2018-03-07

## 2024-04-05 RX ORDER — LOSARTAN POTASSIUM AND HYDROCHLOROTHIAZIDE 100; 25 MG/1; MG/1
TAKE 1 TABLET DAILY TABLET, FILM COATED ORAL
Refills: 0 | Status: ON HOLD | COMMUNITY
Start: 2018-03-07

## 2024-05-06 ENCOUNTER — DASHBOARD ENCOUNTERS (OUTPATIENT)
Age: 69
End: 2024-05-06

## 2024-05-06 ENCOUNTER — OFFICE VISIT (OUTPATIENT)
Dept: URBAN - METROPOLITAN AREA CLINIC 113 | Facility: CLINIC | Age: 69
End: 2024-05-06
Payer: COMMERCIAL

## 2024-05-06 VITALS
DIASTOLIC BLOOD PRESSURE: 64 MMHG | HEIGHT: 66 IN | BODY MASS INDEX: 42.59 KG/M2 | WEIGHT: 265 LBS | TEMPERATURE: 98.1 F | HEART RATE: 72 BPM | SYSTOLIC BLOOD PRESSURE: 119 MMHG | RESPIRATION RATE: 16 BRPM

## 2024-05-06 DIAGNOSIS — R13.19 ESOPHAGEAL DYSPHAGIA: ICD-10-CM

## 2024-05-06 DIAGNOSIS — R10.84 GENERALIZED ABDOMINAL PAIN: ICD-10-CM

## 2024-05-06 DIAGNOSIS — K58.0 IRRITABLE BOWEL SYNDROME WITH DIARRHEA: ICD-10-CM

## 2024-05-06 DIAGNOSIS — K76.0 FATTY LIVER: ICD-10-CM

## 2024-05-06 DIAGNOSIS — K21.9 GASTROESOPHAGEAL REFLUX DISEASE, UNSPECIFIED WHETHER ESOPHAGITIS PRESENT: ICD-10-CM

## 2024-05-06 DIAGNOSIS — Z80.0 FAMILY HISTORY OF COLON CANCER: ICD-10-CM

## 2024-05-06 DIAGNOSIS — K82.8 BILIARY DYSKINESIA: ICD-10-CM

## 2024-05-06 DIAGNOSIS — K85.90 ACUTE PANCREATITIS WITHOUT INFECTION OR NECROSIS, UNSPECIFIED PANCREATITIS TYPE: ICD-10-CM

## 2024-05-06 DIAGNOSIS — R74.8 ELEVATED ALKALINE PHOSPHATASE LEVEL: ICD-10-CM

## 2024-05-06 DIAGNOSIS — R49.0 VOICE HOARSENESS: ICD-10-CM

## 2024-05-06 PROCEDURE — 99214 OFFICE O/P EST MOD 30 MIN: CPT

## 2024-05-06 RX ORDER — RIZATRIPTAN BENZOATE 10 MG/1
TAKE 1 TABLET AT ONSET OF HEADACHE. MAY REPEAT EVERY 2 HOURS AS NEEDED. MAXIMUM 3 TABLETS IN 24 HOURS TABLET ORAL
Refills: 0 | Status: ACTIVE | COMMUNITY
Start: 2018-09-26

## 2024-05-06 RX ORDER — DICYCLOMINE HYDROCHLORIDE 10 MG/1
1 CAPSULE 30 MINUTES BEFORE EATING CAPSULE ORAL
Qty: 270 | Refills: 1 | Status: ACTIVE | COMMUNITY
End: 2024-10-28

## 2024-05-06 RX ORDER — ZOLPIDEM TARTRATE 10 MG/1
1 TABLET AT BEDTIME AS NEEDED TABLET, FILM COATED ORAL ONCE A DAY
Status: ON HOLD | COMMUNITY

## 2024-05-06 RX ORDER — HYDROCHLOROTHIAZIDE 25 MG/1
1 TABLET IN THE MORNING TABLET ORAL ONCE A DAY
Status: ACTIVE | COMMUNITY

## 2024-05-06 RX ORDER — POTASSIUM CHLORIDE 750 MG/1
1 CAPSULE WITH FOOD CAPSULE, EXTENDED RELEASE ORAL TWICE A DAY
Status: ACTIVE | COMMUNITY

## 2024-05-06 RX ORDER — AMLODIPINE BESYLATE 10 MG/1
1 TABLET TABLET ORAL ONCE A DAY
Status: ACTIVE | COMMUNITY

## 2024-05-06 RX ORDER — MINOCYCLINE HYDROCHLORIDE 100 MG/1
1 TABLET TABLET, FILM COATED ORAL
Refills: 0 | Status: ACTIVE | COMMUNITY
Start: 2019-01-02

## 2024-05-06 RX ORDER — LEVOCETIRIZINE DIHYDROCHLORIDE 5 MG/1
1 TABLET IN THE EVENING TABLET ORAL ONCE A DAY
Status: ACTIVE | COMMUNITY

## 2024-05-06 RX ORDER — CYCLOSPORINE 0.5 MG/ML
1 DROP INTO AFFECTED EYE EMULSION OPHTHALMIC TWICE A DAY
Status: ACTIVE | COMMUNITY

## 2024-05-06 RX ORDER — FLUTICASONE PROPIONATE 50 UG/1
1 SPRAY IN EACH NOSTRIL SPRAY, METERED NASAL ONCE A DAY
Status: ACTIVE | COMMUNITY

## 2024-05-06 RX ORDER — FLUOXETINE HYDROCHLORIDE 40 MG/1
TAKE 1 CAPSULE DAILY CAPSULE ORAL
Refills: 0 | Status: ON HOLD | COMMUNITY
Start: 2018-04-18

## 2024-05-06 RX ORDER — TOPIRAMATE 50 MG/1
TAKE 1 TABLET TWICE DAILY TABLET, FILM COATED ORAL
Refills: 0 | Status: ON HOLD | COMMUNITY
Start: 2018-03-07

## 2024-05-06 RX ORDER — DICYCLOMINE HYDROCHLORIDE 10 MG/1
1 CAPSULE 30 MINUTES BEFORE EATING CAPSULE ORAL
Qty: 270 | Refills: 1 | Status: ACTIVE | COMMUNITY

## 2024-05-06 RX ORDER — PREGABALIN 100 MG/1
1 CAPSULE CAPSULE ORAL TWICE DAILY
Status: ACTIVE | COMMUNITY

## 2024-05-06 RX ORDER — GLUCOSAM/MSM/C/MAN/WILLOW/GING 500-333.3
TAKE AS DIRECTED TABLET ORAL
Refills: 0 | Status: ACTIVE | COMMUNITY

## 2024-05-06 RX ORDER — PSYLLIUM HUSK 0.4 G
USE AS DIRECTED CAPSULE ORAL
Refills: 0 | Status: ACTIVE | COMMUNITY

## 2024-05-06 RX ORDER — ERENUMAB-AOOE 140 MG/ML
AS DIRECTED INJECTION, SOLUTION SUBCUTANEOUS
Status: ACTIVE | COMMUNITY

## 2024-05-06 RX ORDER — PRAVASTATIN SODIUM 40 MG/1
TAKE 1 TABLET DAILY AS DIRECTED TABLET ORAL
Refills: 0 | Status: ON HOLD | COMMUNITY
Start: 2018-03-07

## 2024-05-06 RX ORDER — VITAMIN E 200 UNIT
AS DIRECTED CAPSULE ORAL
Status: ACTIVE | COMMUNITY

## 2024-05-06 RX ORDER — DICYCLOMINE HYDROCHLORIDE 10 MG/1
1 CAPSULE 30 MINUTES BEFORE EATING CAPSULE ORAL
Qty: 270 | Refills: 1 | OUTPATIENT

## 2024-05-06 RX ORDER — PANTOPRAZOLE SODIUM 40 MG/1
1 TABLET TABLET, DELAYED RELEASE ORAL ONCE A DAY
Qty: 90 TABLET | Refills: 3 | Status: ACTIVE | COMMUNITY
Start: 2018-03-07

## 2024-05-06 RX ORDER — SENNOSIDES 8.6 MG
TAKE 1 CAPSULE DAILY WITH A MEAL TABLET ORAL
Refills: 0 | Status: ACTIVE | COMMUNITY

## 2024-05-06 RX ORDER — LOSARTAN POTASSIUM AND HYDROCHLOROTHIAZIDE 100; 25 MG/1; MG/1
TAKE 1 TABLET DAILY TABLET, FILM COATED ORAL
Refills: 0 | Status: ON HOLD | COMMUNITY
Start: 2018-03-07

## 2024-05-20 ENCOUNTER — WEB ENCOUNTER (OUTPATIENT)
Dept: URBAN - METROPOLITAN AREA CLINIC 113 | Facility: CLINIC | Age: 69
End: 2024-05-20

## 2024-06-13 ENCOUNTER — TELEPHONE ENCOUNTER (OUTPATIENT)
Dept: URBAN - METROPOLITAN AREA CLINIC 113 | Facility: CLINIC | Age: 69
End: 2024-06-13

## 2024-06-13 RX ORDER — DICYCLOMINE HYDROCHLORIDE 10 MG/1
1 CAPSULE 30 MINUTES BEFORE EATING CAPSULE ORAL
Qty: 270 | Refills: 1
End: 2024-12-10

## 2024-08-07 ENCOUNTER — OFFICE VISIT (OUTPATIENT)
Dept: URBAN - METROPOLITAN AREA CLINIC 113 | Facility: CLINIC | Age: 69
End: 2024-08-07

## 2024-08-07 RX ORDER — AMLODIPINE BESYLATE 10 MG/1
1 TABLET TABLET ORAL ONCE A DAY
Status: ACTIVE | COMMUNITY

## 2024-08-07 RX ORDER — ZOLPIDEM TARTRATE 10 MG/1
1 TABLET AT BEDTIME AS NEEDED TABLET, FILM COATED ORAL ONCE A DAY
Status: ON HOLD | COMMUNITY

## 2024-08-07 RX ORDER — HYDROCHLOROTHIAZIDE 25 MG/1
1 TABLET IN THE MORNING TABLET ORAL ONCE A DAY
Status: ACTIVE | COMMUNITY

## 2024-08-07 RX ORDER — VITAMIN E 200 UNIT
AS DIRECTED CAPSULE ORAL
Status: ACTIVE | COMMUNITY

## 2024-08-07 RX ORDER — SENNOSIDES 8.6 MG
TAKE 1 CAPSULE DAILY WITH A MEAL TABLET ORAL
Refills: 0 | Status: ACTIVE | COMMUNITY

## 2024-08-07 RX ORDER — LEVOCETIRIZINE DIHYDROCHLORIDE 5 MG/1
1 TABLET IN THE EVENING TABLET ORAL ONCE A DAY
Status: ACTIVE | COMMUNITY

## 2024-08-07 RX ORDER — RIZATRIPTAN BENZOATE 10 MG/1
TAKE 1 TABLET AT ONSET OF HEADACHE. MAY REPEAT EVERY 2 HOURS AS NEEDED. MAXIMUM 3 TABLETS IN 24 HOURS TABLET ORAL
Refills: 0 | Status: ACTIVE | COMMUNITY
Start: 2018-09-26

## 2024-08-07 RX ORDER — ERENUMAB-AOOE 140 MG/ML
AS DIRECTED INJECTION, SOLUTION SUBCUTANEOUS
Status: ACTIVE | COMMUNITY

## 2024-08-07 RX ORDER — LOSARTAN POTASSIUM AND HYDROCHLOROTHIAZIDE 100; 25 MG/1; MG/1
TAKE 1 TABLET DAILY TABLET, FILM COATED ORAL
Refills: 0 | Status: ON HOLD | COMMUNITY
Start: 2018-03-07

## 2024-08-07 RX ORDER — POTASSIUM CHLORIDE 750 MG/1
1 CAPSULE WITH FOOD CAPSULE, EXTENDED RELEASE ORAL TWICE A DAY
Status: ACTIVE | COMMUNITY

## 2024-08-07 RX ORDER — PSYLLIUM HUSK 0.4 G
USE AS DIRECTED CAPSULE ORAL
Refills: 0 | Status: ACTIVE | COMMUNITY

## 2024-08-07 RX ORDER — DICYCLOMINE HYDROCHLORIDE 10 MG/1
1 CAPSULE 30 MINUTES BEFORE EATING CAPSULE ORAL
Qty: 270 | Refills: 1 | Status: ACTIVE | COMMUNITY
End: 2024-10-28

## 2024-08-07 RX ORDER — DICYCLOMINE HYDROCHLORIDE 10 MG/1
1 CAPSULE 30 MINUTES BEFORE EATING CAPSULE ORAL
Qty: 270 | Refills: 1 | Status: ACTIVE | COMMUNITY
End: 2024-12-10

## 2024-08-07 RX ORDER — TOPIRAMATE 50 MG/1
TAKE 1 TABLET TWICE DAILY TABLET, FILM COATED ORAL
Refills: 0 | Status: ON HOLD | COMMUNITY
Start: 2018-03-07

## 2024-08-07 RX ORDER — PREGABALIN 100 MG/1
1 CAPSULE CAPSULE ORAL TWICE DAILY
Status: ACTIVE | COMMUNITY

## 2024-08-07 RX ORDER — FLUOXETINE HYDROCHLORIDE 40 MG/1
TAKE 1 CAPSULE DAILY CAPSULE ORAL
Refills: 0 | Status: ON HOLD | COMMUNITY
Start: 2018-04-18

## 2024-08-07 RX ORDER — MINOCYCLINE HYDROCHLORIDE 100 MG/1
1 TABLET TABLET, FILM COATED ORAL
Refills: 0 | Status: ACTIVE | COMMUNITY
Start: 2019-01-02

## 2024-08-07 RX ORDER — CYCLOSPORINE 0.5 MG/ML
1 DROP INTO AFFECTED EYE EMULSION OPHTHALMIC TWICE A DAY
Status: ACTIVE | COMMUNITY

## 2024-08-07 RX ORDER — PANTOPRAZOLE SODIUM 40 MG/1
1 TABLET TABLET, DELAYED RELEASE ORAL ONCE A DAY
Qty: 90 TABLET | Refills: 3 | Status: ACTIVE | COMMUNITY
Start: 2018-03-07

## 2024-08-07 RX ORDER — PRAVASTATIN SODIUM 40 MG/1
TAKE 1 TABLET DAILY AS DIRECTED TABLET ORAL
Refills: 0 | Status: ON HOLD | COMMUNITY
Start: 2018-03-07

## 2024-08-07 RX ORDER — GLUCOSAM/MSM/C/MAN/WILLOW/GING 500-333.3
TAKE AS DIRECTED TABLET ORAL
Refills: 0 | Status: ACTIVE | COMMUNITY

## 2024-08-07 RX ORDER — FLUTICASONE PROPIONATE 50 UG/1
1 SPRAY IN EACH NOSTRIL SPRAY, METERED NASAL ONCE A DAY
Status: ACTIVE | COMMUNITY

## 2024-08-07 NOTE — HPI-TODAY'S VISIT:
68 year-old female presents for follow-up.  Her PPI was switched to Voquezna.  If no improvement she would explore CPAP complications and see a throat specialist for further evaluation. She called shortly after her visit complaining of abdominal pain that felt similar to her pancreatitis.  She was recommended to CT and to stop Foquest now as this was not helping her symptoms.  She switch back to Protonix. CT scan of the abdomen/pelvis with contrast 7/8/2024:Normal liver, gallbladder and pancreas.  No acute pathology overall. 5/2024: 68-year-old female presents for follow-up after EGD with Bravo.  EGD/5/24: Performed without difficulty.  Normal esophagus.  Regular Z-line.  Normal mucosa found in the lower third of the esophagus.  Gastritis, biopsied.  Pathology revealed mild chronic inactive gastritis and associated fragment of reactive appearing foveolar mucosa, negative for H. pylori.  Normal duodenum.  Bravo pH capsule was deployed.  [facility] report showed total DeMeester score of 37.3, indicative of pathologic reflux.  She does still have voice hoarseness. She has requested a referral to a throat specialist, rather than an ENT. She is awaiting to see a neurologist (?) regarding her sleep apnea. She was "miserable" while off of her protonix.   2/1/2024 68-year-old female presents for follow-up.  She was last seen on 7/20/2023.  Her CT scan of the abdomen/pelvis was reordered otherwise she was asymptomatic.  CT scan of the abdomen/pelvis with contrast 7/27/2023: Liver is normal.  Gallbladder is contracted otherwise normal.  Pancreas is normal.  Interval resolution of changes of acute pancreatitis noted on the prior study.  Patient was informed of results.  Labs 9/28/2023:Normal CBC, glucose 119, normal LFTs. Labs 1/26/2024: normal CBC, HbA1c 5.4, normal CMP, triglycerides 184, HDL 74, normal ESR, normal CRP.  Patient was informed her PCP lab results were reviewed and normal.  Her main complaint today is voice hoarseness ongoing since October. She had a laryngoscopy with ENT who stated reflux was causing this. She denies symptoms of heartburn or indigestion, but she does take Protonix 40 mg BID for GERD. She feels her voice has actually changed over the last several years. She enjoys singing and has been unable to reach the same high octaves for some time now. Her bowels alternate between diarrhea and constipation. She has two days of constipation followed by two days of diarrhea.   7/20/2023:  67-year-old female presents for follow-up.  She was last seen on 4/24/2023.  She is planned for CT scan of the abdomen/pelvis to assess for pancreatic inflammation following cholecystectomy.  Regarding her new diagnosis of fatty liver disease found during cholecystectomy, she had lost almost 30 pounds just since September 2022.  She was to repeat LFTs prior to follow-up to calculate fib score. We do not have CT scan for review.  We do not have updated labs.   She does not recall getting a call from [facility] to schedule the CT scan. She denies abdominal pain, nausea or vomiting. She is feeling much better since having hr gallbladder removed. Bowel movements are well controlled on daily probiotic and fiber capsules. In fact, she has felt more constipated of late. She has been traveling more over the last two months and recently went to [place] for a week. She has found it difficult to stick to her diet, but she has only consumed ETOH on one occasion. She does find it hard to exercise given her left knee pain as well. She sees the orthopedist next week.  4/24/2023: 67-year-old female presents for follow-up.  She was last seen on 2/22/2023.  She was planned for cholecystectomy on 3/20.  She did decline a trial of bile acid binders for her diarrhea predominant irritable bowel syndrome.  We would reconsider following cholecystectomy.  We did review swallowing precautions pertaining to pills.  If this persisted we would consider a barium swallow.  Labs 4/19/2023:Low amylase 27, unremarkable hepatic function panel, normal lipase.   She does have a mild constipation issue even despite the surgery. She is having 1-3 bowel movements per day. Her constipation is improving. She had a pleasant experience with Dr. Zapien. She was told her pancreas appeared inflamed during surgery. She was also told she had a fatty liver. She has multiple questions pertaining to treatment of fatty liver and preferred diet. She has lost almost 30 pounds since September. SHe has not had ETOH in 9 months.  2/22/2023 67-year-old female presents for follow-up.  She was last seen on 12/12/2022.  She had a recent episode of acute pancreatitis.  Repeat labs revealed normal LFTs, lipase, CBC and IgG4.  MRCP revealed resolving inflammation of the pancreas though did reveal gallbladder wall thickening.  Follow-up HIDA scan revealed marked biliary dyskinesia.  Question whether this was cause for pancreatitis.  She was referred to surgery.  She did also report a change in bowel habits from diarrhea predominant constipation predominant ongoing since October.  She was recommended to restart fiber.  If no improvement we considered MiraLAX with titration to effect.  Of note colonoscopy in 2020 was normal. It is unclear whether she was seen by Dr. Schwartz.   Labs 1/5/2023: negative urinalysis, normal CBC, Hgb A1c 5.3, normal CMP, normal thyroid studies, cholesterol 284, triglycerides 221, DLDL 197.   It appears patient was again referred to general surgery by PCP in January. She is scheduled for surgery on 3/20 with Dr. Darlene Zapien. She denies nausea or vomiting. She denies any episodes of severe abdominal pain similar to her episode of pancreatitis. She is losing weight slowly. Her stools have returned to diarrhea predominant. THis is her baseline. She does have urgency and occasional incontinence with passing gas. She takes 3 fiber capsules daily. She is also taking a daily probiotic. She uses Imodium maybe 3 times per week for maintenance. She does have dysphagia to pills. She admits to taking over 5 pills at one time. Some of the pills will become stuck at the top of her throat then pass with a second sip of water. She had a CT calcium score. She has since started a statin medication.   12/12/22 67-year-old female presents for follow-up.  She was last seen on 11/30/2022.  Regarding her recent episode of acute pancreatitis, she was planned for additional labs to include IgG subclass 4, repeat lipase and CMP.  She was also planned for MRCP.  Differential included medication induced pancreatitis from thiazide diuretics or previous losartan.  She did report esophageal dysphagia.  We would consider barium swallow or EGD if this persisted.  Regarding her persistent generalized abdominal pain, she was planned for HIDA scan.  Labs 11/30/2022: Normal lipase glucose 105, potassium 3.3, normal LFTs, normal IgG Subclass 4.  MRCP 12/9/2022: Interval resolution of inflammatory changes about the pancreatic head.  Nonspecific abnormal gallbladder wall thickening without evidence of cholelithiasis or choledocholithiasis.  Mild hepatosplenomegaly.  HIDA scan 12/9/2022: Low gallbladder ejection fraction compatible with dyskinesia/dysmotility.  Gallbladder ejection fraction of 13%.  She has been asymptomatic since her last visit. She feels her symptoms have been brought on by Covid. She has had similar symptoms after receiving the Covid vaccines/boosters. She had recently gotten her fifth booster shot. She does not feel her symptoms were related to food. She is watching what she is eating. She is able to tolerate most foods again. She has had symptoms following consumption of dairy. She has had ETOH a few times over the last week without any issues. She started taking Aimovig in October however problems were ongoing prior to this. She has remained on HCTZ for years. She is planning to start a Mediterranean diet for weight loss.   11/30/22 66-year-old female presenting for follow-up.  She was last seen in September 2022. At that time she was having intermittent abdominal pain and had a CT scan performed.  She was found have a partially collapsed urinary bladder which was grossly unremarkable.  She also had a 4 x 3.7 x 3.4 cm left adnexal lesion which was likely compatible with dermoid cyst. She had a repeat CT scan performed on October 4, 2022, Which demonstrated fat stranding around the pancreatic head and parker hepatis concerning for acute pancreatitis. She had an abdominal ultrasound which demonstrated no biliary dilation in her common bile duct was normal at 0.5.  Her gallbladder was also normal.  She does not drink much alcohol. She did not have any before these events. She does still have abdominal pain which is typically across the middle of her abdomen. She did have nausea/emesis but none now. She denies any family hx of pancreatitis.   9/6/22 66-year-old female with a history of hypertension, hyperlipidemia, asthma, obesity, sleep apnea, migraines, upper GI bleed secondary to peptic ulcer disease, GERD, diverticulosis, presenting for evaluation of She was last seen in the office in March for follow-up regarding intermittent diarrhea, which have resolved with use of a daily fiber supplement and probiotic.  Her GERD symptoms were managed with pantoprazole. She returns today with recurrent symptoms. Since October of last year, she has experienced intermittent episodes of numerous vague symptoms which have occurred about every 2-3 months. She states she will start with what resembles cold symptoms, followed by a complete loss of appetite. She is unable to eat for several days. Once she feels she can tolerate oral intake, she can only eat very small portions due to nausea and early satiety. This lasts for 3-4 days, where she is only able to tolerate food such as rice, eggs, or chicken noodle soup. During this time, she will experience diffuse abdominal soreness and indigestion, which are alleviated with dicyclomine. She will have looser stools, which respond to an increase in Metamucil. The symptoms will ultimately subside, and she is asymptomatic until the next episode. She denies blood in the stool, nocturnal defecation, or vomiting during the episodes.   Her bowel habits are regular with Metamucil, and she denies abdominal pain between episodes mentioned above. Her reflux symptoms are typically well managed with twice daily pantoprazole.She has been having issues with pills sticking. She admits she will take more than 5 pills at once.

## 2024-08-16 ENCOUNTER — WEB ENCOUNTER (OUTPATIENT)
Dept: URBAN - METROPOLITAN AREA CLINIC 113 | Facility: CLINIC | Age: 69
End: 2024-08-16

## 2024-08-16 RX ORDER — DICYCLOMINE HYDROCHLORIDE 20 MG/1
1 CAPSULE TABLET ORAL
Qty: 270 | Refills: 1 | OUTPATIENT
Start: 2024-08-21 | End: 2025-02-16

## 2024-09-09 ENCOUNTER — OFFICE VISIT (OUTPATIENT)
Dept: URBAN - METROPOLITAN AREA CLINIC 113 | Facility: CLINIC | Age: 69
End: 2024-09-09

## 2024-11-18 ENCOUNTER — OFFICE VISIT (OUTPATIENT)
Dept: URBAN - METROPOLITAN AREA CLINIC 113 | Facility: CLINIC | Age: 69
End: 2024-11-18
Payer: COMMERCIAL

## 2024-11-18 VITALS
SYSTOLIC BLOOD PRESSURE: 126 MMHG | BODY MASS INDEX: 43.84 KG/M2 | HEIGHT: 66 IN | DIASTOLIC BLOOD PRESSURE: 79 MMHG | RESPIRATION RATE: 18 BRPM | HEART RATE: 71 BPM | WEIGHT: 272.8 LBS | TEMPERATURE: 97.5 F

## 2024-11-18 DIAGNOSIS — K82.8 BILIARY DYSKINESIA: ICD-10-CM

## 2024-11-18 DIAGNOSIS — R19.4 CHANGE IN BOWEL HABITS: ICD-10-CM

## 2024-11-18 DIAGNOSIS — K85.90 ACUTE PANCREATITIS WITHOUT INFECTION OR NECROSIS, UNSPECIFIED PANCREATITIS TYPE: ICD-10-CM

## 2024-11-18 DIAGNOSIS — R13.19 ESOPHAGEAL DYSPHAGIA: ICD-10-CM

## 2024-11-18 DIAGNOSIS — R10.84 GENERALIZED ABDOMINAL PAIN: ICD-10-CM

## 2024-11-18 DIAGNOSIS — R74.8 ELEVATED ALKALINE PHOSPHATASE LEVEL: ICD-10-CM

## 2024-11-18 DIAGNOSIS — K76.0 FATTY LIVER: ICD-10-CM

## 2024-11-18 DIAGNOSIS — K21.9 GASTROESOPHAGEAL REFLUX DISEASE, UNSPECIFIED WHETHER ESOPHAGITIS PRESENT: ICD-10-CM

## 2024-11-18 DIAGNOSIS — Z80.0 FAMILY HISTORY OF COLON CANCER: ICD-10-CM

## 2024-11-18 DIAGNOSIS — K58.0 IRRITABLE BOWEL SYNDROME WITH DIARRHEA: ICD-10-CM

## 2024-11-18 DIAGNOSIS — R49.0 VOICE HOARSENESS: ICD-10-CM

## 2024-11-18 DIAGNOSIS — K57.30 COLON, DIVERTICULOSIS: ICD-10-CM

## 2024-11-18 DIAGNOSIS — R68.81 EARLY SATIETY: ICD-10-CM

## 2024-11-18 PROCEDURE — 99214 OFFICE O/P EST MOD 30 MIN: CPT | Performed by: INTERNAL MEDICINE

## 2024-11-18 RX ORDER — GLUCOSAM/MSM/C/MAN/WILLOW/GING 500-333.3
TAKE AS DIRECTED TABLET ORAL
Refills: 0 | Status: ACTIVE | COMMUNITY

## 2024-11-18 RX ORDER — DICYCLOMINE HYDROCHLORIDE 20 MG/1
1 CAPSULE TABLET ORAL
Qty: 270 | Refills: 1 | Status: ACTIVE | COMMUNITY
Start: 2024-08-21 | End: 2025-02-16

## 2024-11-18 RX ORDER — POTASSIUM CHLORIDE 750 MG/1
1 CAPSULE WITH FOOD CAPSULE, EXTENDED RELEASE ORAL TWICE A DAY
Status: ACTIVE | COMMUNITY

## 2024-11-18 RX ORDER — ERENUMAB-AOOE 140 MG/ML
AS DIRECTED INJECTION, SOLUTION SUBCUTANEOUS
Status: ACTIVE | COMMUNITY

## 2024-11-18 RX ORDER — FAMOTIDINE 40 MG/1
1 TABLET TABLET, FILM COATED ORAL ONCE A DAY
Status: ACTIVE | COMMUNITY

## 2024-11-18 RX ORDER — CYCLOSPORINE 0.5 MG/ML
1 DROP INTO AFFECTED EYE EMULSION OPHTHALMIC TWICE A DAY
Status: ACTIVE | COMMUNITY

## 2024-11-18 RX ORDER — FLUOXETINE HYDROCHLORIDE 40 MG/1
TAKE 1 CAPSULE DAILY CAPSULE ORAL
Refills: 0 | Status: ON HOLD | COMMUNITY
Start: 2018-04-18

## 2024-11-18 RX ORDER — HYDROCHLOROTHIAZIDE 25 MG/1
1 TABLET IN THE MORNING TABLET ORAL ONCE A DAY
Status: ACTIVE | COMMUNITY

## 2024-11-18 RX ORDER — RIZATRIPTAN BENZOATE 10 MG/1
TAKE 1 TABLET AT ONSET OF HEADACHE. MAY REPEAT EVERY 2 HOURS AS NEEDED. MAXIMUM 3 TABLETS IN 24 HOURS TABLET ORAL
Refills: 0 | Status: ACTIVE | COMMUNITY
Start: 2018-09-26

## 2024-11-18 RX ORDER — LOSARTAN POTASSIUM AND HYDROCHLOROTHIAZIDE 100; 25 MG/1; MG/1
TAKE 1 TABLET DAILY TABLET, FILM COATED ORAL
Refills: 0 | Status: ON HOLD | COMMUNITY
Start: 2018-03-07

## 2024-11-18 RX ORDER — DICYCLOMINE HYDROCHLORIDE 10 MG/1
1 CAPSULE 30 MINUTES BEFORE EATING CAPSULE ORAL
Qty: 270 | Refills: 1 | Status: ACTIVE | COMMUNITY
End: 2024-12-10

## 2024-11-18 RX ORDER — PRAVASTATIN SODIUM 40 MG/1
TAKE 1 TABLET DAILY AS DIRECTED TABLET ORAL
Refills: 0 | Status: ON HOLD | COMMUNITY
Start: 2018-03-07

## 2024-11-18 RX ORDER — FLUTICASONE PROPIONATE 50 UG/1
1 SPRAY IN EACH NOSTRIL SPRAY, METERED NASAL ONCE A DAY
Status: ACTIVE | COMMUNITY

## 2024-11-18 RX ORDER — LEVOCETIRIZINE DIHYDROCHLORIDE 5 MG/1
1 TABLET IN THE EVENING TABLET ORAL ONCE A DAY
Status: ACTIVE | COMMUNITY

## 2024-11-18 RX ORDER — ZOLPIDEM TARTRATE 10 MG/1
1 TABLET AT BEDTIME AS NEEDED TABLET, FILM COATED ORAL ONCE A DAY
Status: ON HOLD | COMMUNITY

## 2024-11-18 RX ORDER — PSYLLIUM HUSK 0.4 G
USE AS DIRECTED CAPSULE ORAL
Refills: 0 | Status: ACTIVE | COMMUNITY

## 2024-11-18 RX ORDER — SENNOSIDES 8.6 MG
TAKE 1 CAPSULE DAILY WITH A MEAL TABLET ORAL
Refills: 0 | Status: ACTIVE | COMMUNITY

## 2024-11-18 RX ORDER — VITAMIN E 200 UNIT
AS DIRECTED CAPSULE ORAL
Status: ACTIVE | COMMUNITY

## 2024-11-18 RX ORDER — PREGABALIN 100 MG/1
1 CAPSULE CAPSULE ORAL TWICE DAILY
Status: ACTIVE | COMMUNITY

## 2024-11-18 RX ORDER — AMLODIPINE BESYLATE 10 MG/1
1 TABLET TABLET ORAL ONCE A DAY
Status: ACTIVE | COMMUNITY

## 2024-11-18 RX ORDER — PANTOPRAZOLE SODIUM 40 MG/1
1 TABLET TABLET, DELAYED RELEASE ORAL ONCE A DAY
Qty: 90 TABLET | Refills: 3 | Status: ACTIVE | COMMUNITY
Start: 2018-03-07

## 2024-11-18 RX ORDER — MINOCYCLINE HYDROCHLORIDE 100 MG/1
1 TABLET TABLET, FILM COATED ORAL
Refills: 0 | Status: ACTIVE | COMMUNITY
Start: 2019-01-02

## 2024-11-18 RX ORDER — TOPIRAMATE 50 MG/1
TAKE 1 TABLET TWICE DAILY TABLET, FILM COATED ORAL
Refills: 0 | Status: ON HOLD | COMMUNITY
Start: 2018-03-07

## 2024-11-18 NOTE — HPI-ZZZTODAY'S VISIT
68-year-old female presenting for follow-up.  She was last seen in May 2020 for following an upper endoscopy.  She was found to have pathologic reflux.  She was tried on both Voquezna 20 mg daily.  In regards to her IBS mixed we did recommend discontinuing all over-the-counter medications and supplements.  We also discussed her fatty liver and recommended continued weight loss and diet and exercise.  She is due for a repeat colonoscopy in 2026 for a family history of colon cancer.  She is presenting today for follow-up.  She did have symptoms from the Voquezna. She is back on Pantoprazole daily. She does feel great currently. She is unsure why. She thinks it does correlate with what she eats.

## 2024-12-04 ENCOUNTER — ERX REFILL RESPONSE (OUTPATIENT)
Dept: URBAN - METROPOLITAN AREA CLINIC 113 | Facility: CLINIC | Age: 69
End: 2024-12-04

## 2024-12-04 RX ORDER — DICYCLOMINE HYDROCHLORIDE 10 MG/1
1 CAPSULE 30 MINUTES BEFORE EATING CAPSULE ORAL
Qty: 270 | Refills: 1 | OUTPATIENT

## 2025-02-17 ENCOUNTER — ERX REFILL RESPONSE (OUTPATIENT)
Dept: URBAN - METROPOLITAN AREA CLINIC 113 | Facility: CLINIC | Age: 70
End: 2025-02-17

## 2025-02-17 RX ORDER — DICYCLOMINE HYDROCHLORIDE 10 MG/1
1 CAPSULE 30 MINUTES BEFORE EATING CAPSULE ORAL
Qty: 270 | Refills: 1 | OUTPATIENT

## 2025-02-18 ENCOUNTER — OFFICE VISIT (OUTPATIENT)
Dept: URBAN - METROPOLITAN AREA CLINIC 113 | Facility: CLINIC | Age: 70
End: 2025-02-18
Payer: COMMERCIAL

## 2025-02-18 VITALS
BODY MASS INDEX: 42.43 KG/M2 | RESPIRATION RATE: 16 BRPM | WEIGHT: 264 LBS | HEIGHT: 66 IN | TEMPERATURE: 97 F | DIASTOLIC BLOOD PRESSURE: 79 MMHG | HEART RATE: 74 BPM | SYSTOLIC BLOOD PRESSURE: 132 MMHG

## 2025-02-18 DIAGNOSIS — K58.0 IRRITABLE BOWEL SYNDROME WITH DIARRHEA: ICD-10-CM

## 2025-02-18 DIAGNOSIS — Z80.0 FAMILY HISTORY OF COLON CANCER: ICD-10-CM

## 2025-02-18 DIAGNOSIS — R74.8 ELEVATED ALKALINE PHOSPHATASE LEVEL: ICD-10-CM

## 2025-02-18 DIAGNOSIS — R49.0 VOICE HOARSENESS: ICD-10-CM

## 2025-02-18 DIAGNOSIS — K21.9 GASTROESOPHAGEAL REFLUX DISEASE, UNSPECIFIED WHETHER ESOPHAGITIS PRESENT: ICD-10-CM

## 2025-02-18 DIAGNOSIS — K82.8 BILIARY DYSKINESIA: ICD-10-CM

## 2025-02-18 DIAGNOSIS — K76.0 FATTY LIVER: ICD-10-CM

## 2025-02-18 DIAGNOSIS — R13.19 ESOPHAGEAL DYSPHAGIA: ICD-10-CM

## 2025-02-18 DIAGNOSIS — K85.90 ACUTE PANCREATITIS WITHOUT INFECTION OR NECROSIS, UNSPECIFIED PANCREATITIS TYPE: ICD-10-CM

## 2025-02-18 DIAGNOSIS — R10.84 GENERALIZED ABDOMINAL PAIN: ICD-10-CM

## 2025-02-18 PROCEDURE — 99214 OFFICE O/P EST MOD 30 MIN: CPT

## 2025-02-18 RX ORDER — PRAVASTATIN SODIUM 40 MG/1
TAKE 1 TABLET DAILY AS DIRECTED TABLET ORAL
Refills: 0 | Status: ON HOLD | COMMUNITY
Start: 2018-03-07

## 2025-02-18 RX ORDER — AMLODIPINE BESYLATE 10 MG/1
1 TABLET TABLET ORAL ONCE A DAY
Status: ACTIVE | COMMUNITY

## 2025-02-18 RX ORDER — HYDROCHLOROTHIAZIDE 25 MG/1
1 TABLET IN THE MORNING TABLET ORAL ONCE A DAY
Status: ACTIVE | COMMUNITY

## 2025-02-18 RX ORDER — CYCLOSPORINE 0.5 MG/ML
1 DROP INTO AFFECTED EYE EMULSION OPHTHALMIC TWICE A DAY
Status: ACTIVE | COMMUNITY

## 2025-02-18 RX ORDER — ERENUMAB-AOOE 140 MG/ML
AS DIRECTED INJECTION, SOLUTION SUBCUTANEOUS
Status: ACTIVE | COMMUNITY

## 2025-02-18 RX ORDER — SENNOSIDES 8.6 MG
TAKE 1 CAPSULE DAILY WITH A MEAL TABLET ORAL
Refills: 0 | Status: ACTIVE | COMMUNITY

## 2025-02-18 RX ORDER — LOSARTAN POTASSIUM AND HYDROCHLOROTHIAZIDE 100; 25 MG/1; MG/1
TAKE 1 TABLET DAILY TABLET, FILM COATED ORAL
Refills: 0 | Status: ON HOLD | COMMUNITY
Start: 2018-03-07

## 2025-02-18 RX ORDER — DICYCLOMINE HYDROCHLORIDE 20 MG/1
1 CAPSULE 30 MINUTES BEFORE EATING TABLET ORAL
Qty: 270 | Refills: 1

## 2025-02-18 RX ORDER — LEVOCETIRIZINE DIHYDROCHLORIDE 5 MG/1
1 TABLET IN THE EVENING TABLET ORAL ONCE A DAY
Status: ACTIVE | COMMUNITY

## 2025-02-18 RX ORDER — MINOCYCLINE HYDROCHLORIDE 100 MG/1
1 TABLET TABLET, FILM COATED ORAL
Refills: 0 | Status: ON HOLD | COMMUNITY
Start: 2019-01-02

## 2025-02-18 RX ORDER — FLUTICASONE PROPIONATE 50 UG/1
1 SPRAY IN EACH NOSTRIL SPRAY, METERED NASAL ONCE A DAY
Status: ACTIVE | COMMUNITY

## 2025-02-18 RX ORDER — FAMOTIDINE 40 MG/1
1 TABLET TABLET, FILM COATED ORAL ONCE A DAY
Status: ACTIVE | COMMUNITY

## 2025-02-18 RX ORDER — DICYCLOMINE HYDROCHLORIDE 10 MG/1
1 CAPSULE 30 MINUTES BEFORE EATING CAPSULE ORAL
Qty: 270 | Refills: 1 | Status: ACTIVE | COMMUNITY

## 2025-02-18 RX ORDER — MONTELUKAST 10 MG/1
1 TABLET TABLET, FILM COATED ORAL ONCE A DAY
Status: ACTIVE | COMMUNITY

## 2025-02-18 RX ORDER — VITAMIN E 200 UNIT
AS DIRECTED CAPSULE ORAL
Status: ACTIVE | COMMUNITY

## 2025-02-18 RX ORDER — POTASSIUM CHLORIDE 750 MG/1
1 CAPSULE WITH FOOD CAPSULE, EXTENDED RELEASE ORAL TWICE A DAY
Status: ACTIVE | COMMUNITY

## 2025-02-18 RX ORDER — PANTOPRAZOLE SODIUM 40 MG/1
1 TABLET TABLET, DELAYED RELEASE ORAL ONCE A DAY
Qty: 90 TABLET | Refills: 3 | Status: ACTIVE | COMMUNITY
Start: 2018-03-07

## 2025-02-18 RX ORDER — PANTOPRAZOLE SODIUM 40 MG/1
1 TABLET TABLET, DELAYED RELEASE ORAL ONCE A DAY
Qty: 90 | Refills: 0
Start: 2018-03-07

## 2025-02-18 RX ORDER — RIZATRIPTAN BENZOATE 10 MG/1
TAKE 1 TABLET AT ONSET OF HEADACHE. MAY REPEAT EVERY 2 HOURS AS NEEDED. MAXIMUM 3 TABLETS IN 24 HOURS TABLET ORAL
Refills: 0 | Status: ACTIVE | COMMUNITY
Start: 2018-09-26

## 2025-02-18 RX ORDER — GLUCOSAM/MSM/C/MAN/WILLOW/GING 500-333.3
TAKE AS DIRECTED TABLET ORAL
Refills: 0 | Status: ACTIVE | COMMUNITY

## 2025-02-18 RX ORDER — PREGABALIN 100 MG/1
1 CAPSULE CAPSULE ORAL TWICE DAILY
Status: ACTIVE | COMMUNITY

## 2025-02-18 RX ORDER — ROSUVASTATIN CALCIUM 10 MG/1
1 TABLET TABLET, COATED ORAL ONCE A DAY
Status: ACTIVE | COMMUNITY

## 2025-02-18 RX ORDER — PSYLLIUM HUSK 0.4 G
USE AS DIRECTED CAPSULE ORAL
Refills: 0 | Status: ACTIVE | COMMUNITY

## 2025-02-18 RX ORDER — FLUOXETINE HYDROCHLORIDE 40 MG/1
TAKE 1 CAPSULE DAILY CAPSULE ORAL
Refills: 0 | Status: ON HOLD | COMMUNITY
Start: 2018-04-18

## 2025-02-18 RX ORDER — ZOLPIDEM TARTRATE 10 MG/1
1 TABLET AT BEDTIME AS NEEDED TABLET, FILM COATED ORAL ONCE A DAY
Status: ON HOLD | COMMUNITY

## 2025-02-18 RX ORDER — TOPIRAMATE 50 MG/1
TAKE 1 TABLET TWICE DAILY TABLET, FILM COATED ORAL
Refills: 0 | Status: ON HOLD | COMMUNITY
Start: 2018-03-07

## 2025-02-18 NOTE — HPI-TODAY'S VISIT:
69-year-old female presents for follow-up.  She was last seen in November 2020 for.  She developed symptoms on Foquest number and was switched back to pantoprazole daily.  She was doing very well and not sure why.  Her irritable bowel syndrome is doing well after discontinuing all over-the-counter medications and supplements.  She is due for a colonoscopy in 2026 due to a family history of colon cancer.  She is to continue efforts at diet exercise and weight loss in regards to her fatty liver.  She has had issues with memory loss and has been referred to Dr. Elliott with a pending appointment in March 2025.  She has been recommended to MRI of the brain in the interim.  She had a fall in December. She had an open frature of her tibia and fibula. She underwent surgery and was hospitalized for 12 days. [facility] would not take her and other rehab facilities would not accept her insurance. Her reflux is well controlled. and IBS is stable.

## 2025-02-18 NOTE — HPI-OTHER HISTORIES
CT scan of the abdomen/pelvis with contrast 7/27/2023: Liver is normal. Gallbladder is contracted otherwise normal. Pancreas is normal. Interval resolution of changes of acute pancreatitis noted on the prior study.  Labs 9/28/2023:Normal CBC, glucose 119, normal LFTs. Labs 1/26/2024: normal CBC, HbA1c 5.4, normal CMP, triglycerides 184, HDL 74, normal ESR, normal CRP.  Labs 11/30/2022: Normal lipase glucose 105, potassium 3.3, normal LFTs, normal IgG Subclass 4.  MRCP 12/9/2022: Interval resolution of inflammatory changes about the pancreatic head. Nonspecific abnormal gallbladder wall thickening without evidence of cholelithiasis or choledocholithiasis. Mild hepatosplenomegaly.  HIDA scan 12/9/2022: Low gallbladder ejection fraction compatible with dyskinesia/dysmotility. Gallbladder ejection fraction of 13%.  Colonoscopy 12/3/20: diverticulosis in the sigmoid colon.

## 2025-08-12 ENCOUNTER — WEB ENCOUNTER (OUTPATIENT)
Dept: URBAN - METROPOLITAN AREA CLINIC 113 | Facility: CLINIC | Age: 70
End: 2025-08-12

## 2025-08-18 ENCOUNTER — OFFICE VISIT (OUTPATIENT)
Dept: URBAN - METROPOLITAN AREA CLINIC 113 | Facility: CLINIC | Age: 70
End: 2025-08-18

## 2025-08-20 ENCOUNTER — OFFICE VISIT (OUTPATIENT)
Dept: URBAN - METROPOLITAN AREA CLINIC 113 | Facility: CLINIC | Age: 70
End: 2025-08-20
Payer: COMMERCIAL

## 2025-08-20 DIAGNOSIS — K21.9 GERD WITHOUT ESOPHAGITIS: ICD-10-CM

## 2025-08-20 DIAGNOSIS — Z86.0101 HISTORY OF ADENOMATOUS POLYP OF COLON: ICD-10-CM

## 2025-08-20 DIAGNOSIS — K59.09 CHRONIC CONSTIPATION: ICD-10-CM

## 2025-08-20 DIAGNOSIS — R10.84 ABDOMINAL CRAMPING, GENERALIZED: ICD-10-CM

## 2025-08-20 DIAGNOSIS — K76.0 FATTY LIVER: ICD-10-CM

## 2025-08-20 PROBLEM — 266435005: Status: ACTIVE | Noted: 2025-08-20

## 2025-08-20 PROBLEM — 429047008: Status: ACTIVE | Noted: 2025-08-20

## 2025-08-20 PROBLEM — 247330004: Status: ACTIVE | Noted: 2025-08-20

## 2025-08-20 PROBLEM — 236069009: Status: ACTIVE | Noted: 2025-08-20

## 2025-08-20 PROCEDURE — 99213 OFFICE O/P EST LOW 20 MIN: CPT | Performed by: NURSE PRACTITIONER

## 2025-08-20 RX ORDER — LEVOCETIRIZINE DIHYDROCHLORIDE 5 MG/1
1 TABLET IN THE EVENING TABLET ORAL ONCE A DAY
Status: ACTIVE | COMMUNITY

## 2025-08-20 RX ORDER — ZOLPIDEM TARTRATE 10 MG/1
1 TABLET AT BEDTIME AS NEEDED TABLET, FILM COATED ORAL ONCE A DAY
Status: ON HOLD | COMMUNITY

## 2025-08-20 RX ORDER — TOPIRAMATE 50 MG/1
TAKE 1 TABLET TWICE DAILY TABLET, FILM COATED ORAL
Refills: 0 | Status: ON HOLD | COMMUNITY
Start: 2018-03-07

## 2025-08-20 RX ORDER — DICYCLOMINE HYDROCHLORIDE 20 MG/1
1 CAPSULE 30 MINUTES BEFORE EATING TABLET ORAL
Qty: 270 | Refills: 1 | Status: ACTIVE | COMMUNITY

## 2025-08-20 RX ORDER — FAMOTIDINE 40 MG/1
1 TABLET TABLET, FILM COATED ORAL ONCE A DAY
Status: ON HOLD | COMMUNITY

## 2025-08-20 RX ORDER — POTASSIUM CHLORIDE 750 MG/1
1 CAPSULE WITH FOOD CAPSULE, EXTENDED RELEASE ORAL TWICE A DAY
Status: ACTIVE | COMMUNITY

## 2025-08-20 RX ORDER — RIZATRIPTAN BENZOATE 10 MG/1
TAKE 1 TABLET AT ONSET OF HEADACHE. MAY REPEAT EVERY 2 HOURS AS NEEDED. MAXIMUM 3 TABLETS IN 24 HOURS TABLET ORAL
Refills: 0 | Status: ACTIVE | COMMUNITY
Start: 2018-09-26

## 2025-08-20 RX ORDER — CYCLOSPORINE 0.5 MG/ML
1 DROP INTO AFFECTED EYE EMULSION OPHTHALMIC TWICE A DAY
Status: ACTIVE | COMMUNITY

## 2025-08-20 RX ORDER — SODIUM, POTASSIUM,MAG SULFATES 17.5-3.13G
354 ML SOLUTION, RECONSTITUTED, ORAL ORAL ONCE
Qty: 354 MILLILITER | Refills: 0 | OUTPATIENT
Start: 2025-08-20 | End: 2025-08-21

## 2025-08-20 RX ORDER — DICYCLOMINE HYDROCHLORIDE 10 MG/1
1 CAPSULE 30 MINUTES BEFORE EATING CAPSULE ORAL
Qty: 270 | Refills: 1 | Status: ON HOLD | COMMUNITY

## 2025-08-20 RX ORDER — AMLODIPINE BESYLATE 10 MG/1
1 TABLET TABLET ORAL ONCE A DAY
Status: ACTIVE | COMMUNITY

## 2025-08-20 RX ORDER — LOSARTAN POTASSIUM AND HYDROCHLOROTHIAZIDE 100; 25 MG/1; MG/1
TAKE 1 TABLET DAILY TABLET, FILM COATED ORAL
Refills: 0 | Status: ON HOLD | COMMUNITY
Start: 2018-03-07

## 2025-08-20 RX ORDER — VITAMIN E 200 UNIT
AS DIRECTED CAPSULE ORAL
Status: ACTIVE | COMMUNITY

## 2025-08-20 RX ORDER — PREGABALIN 100 MG/1
1 CAPSULE CAPSULE ORAL TWICE DAILY
Status: ACTIVE | COMMUNITY

## 2025-08-20 RX ORDER — MINOCYCLINE HYDROCHLORIDE 100 MG/1
1 TABLET TABLET, FILM COATED ORAL
Refills: 0 | Status: ON HOLD | COMMUNITY
Start: 2019-01-02

## 2025-08-20 RX ORDER — GLUCOSAM/MSM/C/MAN/WILLOW/GING 500-333.3
TAKE AS DIRECTED TABLET ORAL
Refills: 0 | Status: ACTIVE | COMMUNITY

## 2025-08-20 RX ORDER — PSYLLIUM HUSK 0.4 G
USE AS DIRECTED CAPSULE ORAL
Refills: 0 | Status: ACTIVE | COMMUNITY

## 2025-08-20 RX ORDER — ERENUMAB-AOOE 140 MG/ML
AS DIRECTED INJECTION, SOLUTION SUBCUTANEOUS
Status: ACTIVE | COMMUNITY

## 2025-08-20 RX ORDER — PANTOPRAZOLE SODIUM 40 MG/1
1 TABLET TABLET, DELAYED RELEASE ORAL ONCE A DAY
Qty: 90 TABLET | Refills: 3 | OUTPATIENT
Start: 2025-08-20

## 2025-08-20 RX ORDER — ROSUVASTATIN CALCIUM 10 MG/1
1 TABLET TABLET, COATED ORAL ONCE A DAY
Status: ACTIVE | COMMUNITY

## 2025-08-20 RX ORDER — HYDROCHLOROTHIAZIDE 25 MG/1
1 TABLET IN THE MORNING TABLET ORAL ONCE A DAY
Status: ACTIVE | COMMUNITY

## 2025-08-20 RX ORDER — FLUOXETINE HYDROCHLORIDE 40 MG/1
TAKE 1 CAPSULE DAILY CAPSULE ORAL
Refills: 0 | Status: ON HOLD | COMMUNITY
Start: 2018-04-18

## 2025-08-20 RX ORDER — PANTOPRAZOLE SODIUM 40 MG/1
1 TABLET TABLET, DELAYED RELEASE ORAL ONCE A DAY
Qty: 90 | Refills: 0 | Status: ACTIVE | COMMUNITY
Start: 2018-03-07

## 2025-08-20 RX ORDER — MONTELUKAST 10 MG/1
1 TABLET TABLET, FILM COATED ORAL ONCE A DAY
Status: ACTIVE | COMMUNITY

## 2025-08-20 RX ORDER — PRAVASTATIN SODIUM 40 MG/1
TAKE 1 TABLET DAILY AS DIRECTED TABLET ORAL
Refills: 0 | Status: ON HOLD | COMMUNITY
Start: 2018-03-07

## 2025-08-20 RX ORDER — FLUTICASONE PROPIONATE 50 UG/1
1 SPRAY IN EACH NOSTRIL SPRAY, METERED NASAL ONCE A DAY
Status: ACTIVE | COMMUNITY

## 2025-08-20 RX ORDER — SENNOSIDES 8.6 MG
TAKE 1 CAPSULE DAILY WITH A MEAL TABLET ORAL
Refills: 0 | Status: ACTIVE | COMMUNITY